# Patient Record
Sex: MALE | Race: WHITE | Employment: OTHER | URBAN - METROPOLITAN AREA
[De-identification: names, ages, dates, MRNs, and addresses within clinical notes are randomized per-mention and may not be internally consistent; named-entity substitution may affect disease eponyms.]

---

## 2018-03-11 PROBLEM — R07.9 CHEST PAIN: Status: ACTIVE | Noted: 2018-03-11

## 2020-05-19 ENCOUNTER — APPOINTMENT (OUTPATIENT)
Dept: GENERAL RADIOLOGY | Age: 60
End: 2020-05-19
Payer: MEDICARE

## 2020-05-19 ENCOUNTER — HOSPITAL ENCOUNTER (OUTPATIENT)
Age: 60
Setting detail: OBSERVATION
Discharge: HOME | End: 2020-05-20
Attending: EMERGENCY MEDICINE | Admitting: INTERNAL MEDICINE
Payer: MEDICARE

## 2020-05-19 LAB
A/G RATIO: 1.5 (ref 1.1–2.2)
ALBUMIN SERPL-MCNC: 4.3 G/DL (ref 3.4–5)
ALP BLD-CCNC: 115 U/L (ref 40–129)
ALT SERPL-CCNC: 76 U/L (ref 10–40)
ANION GAP SERPL CALCULATED.3IONS-SCNC: 11 MMOL/L (ref 3–16)
ANISOCYTOSIS: ABNORMAL
AST SERPL-CCNC: 181 U/L (ref 15–37)
BASOPHILS ABSOLUTE: 0 K/UL (ref 0–0.2)
BASOPHILS RELATIVE PERCENT: 1.3 %
BILIRUB SERPL-MCNC: 1.5 MG/DL (ref 0–1)
BUN BLDV-MCNC: 5 MG/DL (ref 7–20)
CALCIUM SERPL-MCNC: 9.4 MG/DL (ref 8.3–10.6)
CHLORIDE BLD-SCNC: 101 MMOL/L (ref 99–110)
CO2: 26 MMOL/L (ref 21–32)
CREAT SERPL-MCNC: <0.5 MG/DL (ref 0.8–1.3)
EOSINOPHILS ABSOLUTE: 0.1 K/UL (ref 0–0.6)
EOSINOPHILS RELATIVE PERCENT: 2.5 %
ETHANOL: NORMAL MG/DL (ref 0–0.08)
GFR AFRICAN AMERICAN: >60
GFR NON-AFRICAN AMERICAN: >60
GLOBULIN: 2.8 G/DL
GLUCOSE BLD-MCNC: 111 MG/DL (ref 70–99)
HCT VFR BLD CALC: 35.7 % (ref 40.5–52.5)
HEMATOLOGY PATH CONSULT: YES
HEMOGLOBIN: 12.5 G/DL (ref 13.5–17.5)
LYMPHOCYTES ABSOLUTE: 1.2 K/UL (ref 1–5.1)
LYMPHOCYTES RELATIVE PERCENT: 35.2 %
MAGNESIUM: 1 MG/DL (ref 1.8–2.4)
MCH RBC QN AUTO: 39.4 PG (ref 26–34)
MCHC RBC AUTO-ENTMCNC: 34.9 G/DL (ref 31–36)
MCV RBC AUTO: 113.1 FL (ref 80–100)
MONOCYTES ABSOLUTE: 0.3 K/UL (ref 0–1.3)
MONOCYTES RELATIVE PERCENT: 8.5 %
NEUTROPHILS ABSOLUTE: 1.8 K/UL (ref 1.7–7.7)
NEUTROPHILS RELATIVE PERCENT: 52.5 %
PDW BLD-RTO: 16.1 % (ref 12.4–15.4)
PLATELET # BLD: 118 K/UL (ref 135–450)
PLATELET SLIDE REVIEW: ABNORMAL
PMV BLD AUTO: 7.5 FL (ref 5–10.5)
POIKILOCYTES: ABNORMAL
POLYCHROMASIA: ABNORMAL
POTASSIUM REFLEX MAGNESIUM: 3.4 MMOL/L (ref 3.5–5.1)
RBC # BLD: 3.16 M/UL (ref 4.2–5.9)
SLIDE REVIEW: ABNORMAL
SODIUM BLD-SCNC: 138 MMOL/L (ref 136–145)
TOTAL PROTEIN: 7.1 G/DL (ref 6.4–8.2)
TROPONIN: <0.01 NG/ML
TROPONIN: <0.01 NG/ML
WBC # BLD: 3.4 K/UL (ref 4–11)

## 2020-05-19 PROCEDURE — 96366 THER/PROPH/DIAG IV INF ADDON: CPT

## 2020-05-19 PROCEDURE — G0480 DRUG TEST DEF 1-7 CLASSES: HCPCS

## 2020-05-19 PROCEDURE — 6360000002 HC RX W HCPCS: Performed by: EMERGENCY MEDICINE

## 2020-05-19 PROCEDURE — 83735 ASSAY OF MAGNESIUM: CPT

## 2020-05-19 PROCEDURE — G0378 HOSPITAL OBSERVATION PER HR: HCPCS

## 2020-05-19 PROCEDURE — 2580000003 HC RX 258: Performed by: NURSE PRACTITIONER

## 2020-05-19 PROCEDURE — 6370000000 HC RX 637 (ALT 250 FOR IP): Performed by: EMERGENCY MEDICINE

## 2020-05-19 PROCEDURE — 96365 THER/PROPH/DIAG IV INF INIT: CPT

## 2020-05-19 PROCEDURE — 36415 COLL VENOUS BLD VENIPUNCTURE: CPT

## 2020-05-19 PROCEDURE — 80053 COMPREHEN METABOLIC PANEL: CPT

## 2020-05-19 PROCEDURE — 2580000003 HC RX 258: Performed by: EMERGENCY MEDICINE

## 2020-05-19 PROCEDURE — 71045 X-RAY EXAM CHEST 1 VIEW: CPT

## 2020-05-19 PROCEDURE — 2500000003 HC RX 250 WO HCPCS: Performed by: EMERGENCY MEDICINE

## 2020-05-19 PROCEDURE — 84484 ASSAY OF TROPONIN QUANT: CPT

## 2020-05-19 PROCEDURE — 99291 CRITICAL CARE FIRST HOUR: CPT

## 2020-05-19 PROCEDURE — 6370000000 HC RX 637 (ALT 250 FOR IP): Performed by: NURSE PRACTITIONER

## 2020-05-19 PROCEDURE — 93005 ELECTROCARDIOGRAM TRACING: CPT | Performed by: EMERGENCY MEDICINE

## 2020-05-19 PROCEDURE — 85025 COMPLETE CBC W/AUTO DIFF WBC: CPT

## 2020-05-19 RX ORDER — POLYETHYLENE GLYCOL 3350 17 G/17G
17 POWDER, FOR SOLUTION ORAL DAILY PRN
Status: DISCONTINUED | OUTPATIENT
Start: 2020-05-19 | End: 2020-05-20 | Stop reason: HOSPADM

## 2020-05-19 RX ORDER — RANITIDINE 150 MG/1
150 TABLET ORAL 2 TIMES DAILY
Status: DISCONTINUED | OUTPATIENT
Start: 2020-05-19 | End: 2020-05-20

## 2020-05-19 RX ORDER — ATORVASTATIN CALCIUM 40 MG/1
40 TABLET, FILM COATED ORAL NIGHTLY
Status: DISCONTINUED | OUTPATIENT
Start: 2020-05-19 | End: 2020-05-20 | Stop reason: HOSPADM

## 2020-05-19 RX ORDER — MAGNESIUM SULFATE IN WATER 40 MG/ML
2 INJECTION, SOLUTION INTRAVENOUS ONCE
Status: COMPLETED | OUTPATIENT
Start: 2020-05-19 | End: 2020-05-19

## 2020-05-19 RX ORDER — PROMETHAZINE HYDROCHLORIDE 25 MG/1
12.5 TABLET ORAL EVERY 6 HOURS PRN
Status: DISCONTINUED | OUTPATIENT
Start: 2020-05-19 | End: 2020-05-20 | Stop reason: HOSPADM

## 2020-05-19 RX ORDER — VITAMIN B COMPLEX
2000 TABLET ORAL DAILY
Status: DISCONTINUED | OUTPATIENT
Start: 2020-05-20 | End: 2020-05-20 | Stop reason: HOSPADM

## 2020-05-19 RX ORDER — ALLOPURINOL 300 MG/1
300 TABLET ORAL DAILY
Status: DISCONTINUED | OUTPATIENT
Start: 2020-05-20 | End: 2020-05-20 | Stop reason: HOSPADM

## 2020-05-19 RX ORDER — NITROGLYCERIN 0.4 MG/1
0.4 TABLET SUBLINGUAL EVERY 5 MIN PRN
Status: DISCONTINUED | OUTPATIENT
Start: 2020-05-19 | End: 2020-05-20 | Stop reason: HOSPADM

## 2020-05-19 RX ORDER — OXYCODONE HYDROCHLORIDE AND ACETAMINOPHEN 5; 325 MG/1; MG/1
1 TABLET ORAL ONCE
Status: COMPLETED | OUTPATIENT
Start: 2020-05-19 | End: 2020-05-19

## 2020-05-19 RX ORDER — CHLORDIAZEPOXIDE HYDROCHLORIDE 25 MG/1
25 CAPSULE, GELATIN COATED ORAL 4 TIMES DAILY
Status: DISCONTINUED | OUTPATIENT
Start: 2020-05-19 | End: 2020-05-20 | Stop reason: HOSPADM

## 2020-05-19 RX ORDER — LISINOPRIL 20 MG/1
20 TABLET ORAL DAILY
Status: DISCONTINUED | OUTPATIENT
Start: 2020-05-20 | End: 2020-05-20 | Stop reason: HOSPADM

## 2020-05-19 RX ORDER — ACETAMINOPHEN 325 MG/1
650 TABLET ORAL EVERY 6 HOURS PRN
Status: DISCONTINUED | OUTPATIENT
Start: 2020-05-19 | End: 2020-05-20 | Stop reason: HOSPADM

## 2020-05-19 RX ORDER — SODIUM CHLORIDE 0.9 % (FLUSH) 0.9 %
10 SYRINGE (ML) INJECTION PRN
Status: DISCONTINUED | OUTPATIENT
Start: 2020-05-19 | End: 2020-05-20 | Stop reason: HOSPADM

## 2020-05-19 RX ORDER — OXYCODONE HYDROCHLORIDE AND ACETAMINOPHEN 5; 325 MG/1; MG/1
1 TABLET ORAL 3 TIMES DAILY PRN
Status: DISCONTINUED | OUTPATIENT
Start: 2020-05-19 | End: 2020-05-20 | Stop reason: HOSPADM

## 2020-05-19 RX ORDER — ONDANSETRON 2 MG/ML
4 INJECTION INTRAMUSCULAR; INTRAVENOUS EVERY 6 HOURS PRN
Status: DISCONTINUED | OUTPATIENT
Start: 2020-05-19 | End: 2020-05-20 | Stop reason: HOSPADM

## 2020-05-19 RX ORDER — ACETAMINOPHEN 650 MG/1
650 SUPPOSITORY RECTAL EVERY 6 HOURS PRN
Status: DISCONTINUED | OUTPATIENT
Start: 2020-05-19 | End: 2020-05-20 | Stop reason: HOSPADM

## 2020-05-19 RX ORDER — SODIUM CHLORIDE 0.9 % (FLUSH) 0.9 %
10 SYRINGE (ML) INJECTION EVERY 12 HOURS SCHEDULED
Status: DISCONTINUED | OUTPATIENT
Start: 2020-05-19 | End: 2020-05-20 | Stop reason: HOSPADM

## 2020-05-19 RX ADMIN — ATORVASTATIN CALCIUM 40 MG: 40 TABLET, FILM COATED ORAL at 23:40

## 2020-05-19 RX ADMIN — NITROGLYCERIN 1 INCH: 20 OINTMENT TOPICAL at 21:22

## 2020-05-19 RX ADMIN — OXYCODONE HYDROCHLORIDE AND ACETAMINOPHEN 1 TABLET: 5; 325 TABLET ORAL at 21:22

## 2020-05-19 RX ADMIN — CHLORDIAZEPOXIDE HYDROCHLORIDE 25 MG: 25 CAPSULE ORAL at 23:40

## 2020-05-19 RX ADMIN — MAGNESIUM SULFATE HEPTAHYDRATE 2 G: 40 INJECTION, SOLUTION INTRAVENOUS at 21:22

## 2020-05-19 RX ADMIN — Medication 10 ML: at 23:40

## 2020-05-19 RX ADMIN — THIAMINE HYDROCHLORIDE: 100 INJECTION, SOLUTION INTRAMUSCULAR; INTRAVENOUS at 23:40

## 2020-05-19 ASSESSMENT — PAIN DESCRIPTION - LOCATION: LOCATION: CHEST

## 2020-05-19 ASSESSMENT — PAIN SCALES - GENERAL
PAINLEVEL_OUTOF10: 3
PAINLEVEL_OUTOF10: 9

## 2020-05-19 ASSESSMENT — PAIN DESCRIPTION - ORIENTATION: ORIENTATION: MID

## 2020-05-19 ASSESSMENT — HEART SCORE: ECG: 1

## 2020-05-20 VITALS
SYSTOLIC BLOOD PRESSURE: 147 MMHG | HEART RATE: 70 BPM | WEIGHT: 181.9 LBS | OXYGEN SATURATION: 97 % | BODY MASS INDEX: 21.05 KG/M2 | HEIGHT: 78 IN | DIASTOLIC BLOOD PRESSURE: 82 MMHG | RESPIRATION RATE: 18 BRPM | TEMPERATURE: 98.2 F

## 2020-05-20 LAB
ALBUMIN SERPL-MCNC: 3.5 G/DL (ref 3.4–5)
ALP BLD-CCNC: 95 U/L (ref 40–129)
ALT SERPL-CCNC: 59 U/L (ref 10–40)
ANION GAP SERPL CALCULATED.3IONS-SCNC: 11 MMOL/L (ref 3–16)
AST SERPL-CCNC: 130 U/L (ref 15–37)
BILIRUB SERPL-MCNC: 1.9 MG/DL (ref 0–1)
BILIRUBIN DIRECT: 0.7 MG/DL (ref 0–0.3)
BILIRUBIN, INDIRECT: 1.2 MG/DL (ref 0–1)
BUN BLDV-MCNC: 6 MG/DL (ref 7–20)
CALCIUM SERPL-MCNC: 8.5 MG/DL (ref 8.3–10.6)
CHLORIDE BLD-SCNC: 103 MMOL/L (ref 99–110)
CHOLESTEROL, TOTAL: 112 MG/DL (ref 0–199)
CO2: 24 MMOL/L (ref 21–32)
CREAT SERPL-MCNC: <0.5 MG/DL (ref 0.8–1.3)
EKG ATRIAL RATE: 71 BPM
EKG ATRIAL RATE: 78 BPM
EKG DIAGNOSIS: NORMAL
EKG DIAGNOSIS: NORMAL
EKG P AXIS: 52 DEGREES
EKG P AXIS: 56 DEGREES
EKG P-R INTERVAL: 154 MS
EKG P-R INTERVAL: 164 MS
EKG Q-T INTERVAL: 374 MS
EKG Q-T INTERVAL: 422 MS
EKG QRS DURATION: 84 MS
EKG QRS DURATION: 88 MS
EKG QTC CALCULATION (BAZETT): 426 MS
EKG QTC CALCULATION (BAZETT): 458 MS
EKG R AXIS: -23 DEGREES
EKG R AXIS: -26 DEGREES
EKG T AXIS: 30 DEGREES
EKG T AXIS: 40 DEGREES
EKG VENTRICULAR RATE: 71 BPM
EKG VENTRICULAR RATE: 78 BPM
FOLATE: >20 NG/ML (ref 4.78–24.2)
GFR AFRICAN AMERICAN: >60
GFR NON-AFRICAN AMERICAN: >60
GLUCOSE BLD-MCNC: 93 MG/DL (ref 70–99)
HCT VFR BLD CALC: 31 % (ref 40.5–52.5)
HDLC SERPL-MCNC: 61 MG/DL (ref 40–60)
HEMATOLOGY PATH CONSULT: NO
HEMATOLOGY PATH CONSULT: NORMAL
HEMOGLOBIN: 10.8 G/DL (ref 13.5–17.5)
LDL CHOLESTEROL CALCULATED: 41 MG/DL
LV EF: 60 %
LVEF MODALITY: NORMAL
MAGNESIUM: 1.4 MG/DL (ref 1.8–2.4)
MCH RBC QN AUTO: 39.9 PG (ref 26–34)
MCHC RBC AUTO-ENTMCNC: 35 G/DL (ref 31–36)
MCV RBC AUTO: 114.2 FL (ref 80–100)
PDW BLD-RTO: 15.6 % (ref 12.4–15.4)
PLATELET # BLD: 99 K/UL (ref 135–450)
PLATELET SLIDE REVIEW: ABNORMAL
PMV BLD AUTO: 8 FL (ref 5–10.5)
POTASSIUM REFLEX MAGNESIUM: 3.2 MMOL/L (ref 3.5–5.1)
RBC # BLD: 2.71 M/UL (ref 4.2–5.9)
SLIDE REVIEW: ABNORMAL
SODIUM BLD-SCNC: 138 MMOL/L (ref 136–145)
TOTAL PROTEIN: 5.9 G/DL (ref 6.4–8.2)
TRIGL SERPL-MCNC: 51 MG/DL (ref 0–150)
TROPONIN: <0.01 NG/ML
VITAMIN B-12: 946 PG/ML (ref 211–911)
VLDLC SERPL CALC-MCNC: 10 MG/DL
WBC # BLD: 3 K/UL (ref 4–11)

## 2020-05-20 PROCEDURE — G0378 HOSPITAL OBSERVATION PER HR: HCPCS

## 2020-05-20 PROCEDURE — 2580000003 HC RX 258: Performed by: NURSE PRACTITIONER

## 2020-05-20 PROCEDURE — 6360000004 HC RX CONTRAST MEDICATION: Performed by: INTERNAL MEDICINE

## 2020-05-20 PROCEDURE — 6370000000 HC RX 637 (ALT 250 FOR IP): Performed by: NURSE PRACTITIONER

## 2020-05-20 PROCEDURE — 84484 ASSAY OF TROPONIN QUANT: CPT

## 2020-05-20 PROCEDURE — 83735 ASSAY OF MAGNESIUM: CPT

## 2020-05-20 PROCEDURE — 6360000002 HC RX W HCPCS: Performed by: INTERNAL MEDICINE

## 2020-05-20 PROCEDURE — 6370000000 HC RX 637 (ALT 250 FOR IP): Performed by: INTERNAL MEDICINE

## 2020-05-20 PROCEDURE — 80048 BASIC METABOLIC PNL TOTAL CA: CPT

## 2020-05-20 PROCEDURE — 93005 ELECTROCARDIOGRAM TRACING: CPT | Performed by: NURSE PRACTITIONER

## 2020-05-20 PROCEDURE — 93010 ELECTROCARDIOGRAM REPORT: CPT | Performed by: INTERNAL MEDICINE

## 2020-05-20 PROCEDURE — 80061 LIPID PANEL: CPT

## 2020-05-20 PROCEDURE — 85027 COMPLETE CBC AUTOMATED: CPT

## 2020-05-20 PROCEDURE — 96366 THER/PROPH/DIAG IV INF ADDON: CPT

## 2020-05-20 PROCEDURE — 82607 VITAMIN B-12: CPT

## 2020-05-20 PROCEDURE — 93351 STRESS TTE COMPLETE: CPT

## 2020-05-20 PROCEDURE — 99204 OFFICE O/P NEW MOD 45 MIN: CPT | Performed by: INTERNAL MEDICINE

## 2020-05-20 PROCEDURE — 80076 HEPATIC FUNCTION PANEL: CPT

## 2020-05-20 PROCEDURE — 82746 ASSAY OF FOLIC ACID SERUM: CPT

## 2020-05-20 RX ORDER — CARVEDILOL 6.25 MG/1
6.25 TABLET ORAL 2 TIMES DAILY WITH MEALS
Status: DISCONTINUED | OUTPATIENT
Start: 2020-05-20 | End: 2020-05-20 | Stop reason: HOSPADM

## 2020-05-20 RX ORDER — CARVEDILOL 6.25 MG/1
6.25 TABLET ORAL 2 TIMES DAILY WITH MEALS
Qty: 60 TABLET | Refills: 3 | Status: SHIPPED | OUTPATIENT
Start: 2020-05-20 | End: 2020-05-26

## 2020-05-20 RX ORDER — FAMOTIDINE 20 MG/1
20 TABLET, FILM COATED ORAL 2 TIMES DAILY
Status: DISCONTINUED | OUTPATIENT
Start: 2020-05-20 | End: 2020-05-20 | Stop reason: HOSPADM

## 2020-05-20 RX ORDER — HYDRALAZINE HYDROCHLORIDE 20 MG/ML
10 INJECTION INTRAMUSCULAR; INTRAVENOUS EVERY 4 HOURS PRN
Status: DISCONTINUED | OUTPATIENT
Start: 2020-05-20 | End: 2020-05-20 | Stop reason: HOSPADM

## 2020-05-20 RX ORDER — MAGNESIUM SULFATE IN WATER 40 MG/ML
2 INJECTION, SOLUTION INTRAVENOUS ONCE
Status: COMPLETED | OUTPATIENT
Start: 2020-05-20 | End: 2020-05-20

## 2020-05-20 RX ORDER — POTASSIUM CHLORIDE 20 MEQ/1
40 TABLET, EXTENDED RELEASE ORAL 3 TIMES DAILY
Status: COMPLETED | OUTPATIENT
Start: 2020-05-20 | End: 2020-05-20

## 2020-05-20 RX ADMIN — LISINOPRIL 20 MG: 20 TABLET ORAL at 09:45

## 2020-05-20 RX ADMIN — FAMOTIDINE 20 MG: 20 TABLET, FILM COATED ORAL at 09:45

## 2020-05-20 RX ADMIN — MAGNESIUM SULFATE HEPTAHYDRATE 2 G: 40 INJECTION, SOLUTION INTRAVENOUS at 11:48

## 2020-05-20 RX ADMIN — POTASSIUM CHLORIDE 40 MEQ: 20 TABLET, EXTENDED RELEASE ORAL at 15:32

## 2020-05-20 RX ADMIN — PERFLUTREN 2.2 MG: 6.52 INJECTION, SUSPENSION INTRAVENOUS at 09:02

## 2020-05-20 RX ADMIN — OXYCODONE HYDROCHLORIDE AND ACETAMINOPHEN 1 TABLET: 5; 325 TABLET ORAL at 02:27

## 2020-05-20 RX ADMIN — CARVEDILOL 6.25 MG: 6.25 TABLET, FILM COATED ORAL at 17:24

## 2020-05-20 RX ADMIN — VITAMIN D, TAB 1000IU (100/BT) 2000 UNITS: 25 TAB at 09:45

## 2020-05-20 RX ADMIN — NITROGLYCERIN 0.4 MG: 0.4 TABLET, ORALLY DISINTEGRATING SUBLINGUAL at 08:31

## 2020-05-20 RX ADMIN — Medication 10 ML: at 09:46

## 2020-05-20 RX ADMIN — CHLORDIAZEPOXIDE HYDROCHLORIDE 25 MG: 25 CAPSULE ORAL at 15:32

## 2020-05-20 RX ADMIN — ALLOPURINOL 300 MG: 300 TABLET ORAL at 09:45

## 2020-05-20 RX ADMIN — POTASSIUM CHLORIDE 40 MEQ: 20 TABLET, EXTENDED RELEASE ORAL at 11:44

## 2020-05-20 RX ADMIN — CHLORDIAZEPOXIDE HYDROCHLORIDE 25 MG: 25 CAPSULE ORAL at 09:45

## 2020-05-20 RX ADMIN — OXYCODONE HYDROCHLORIDE AND ACETAMINOPHEN 1 TABLET: 5; 325 TABLET ORAL at 11:48

## 2020-05-20 ASSESSMENT — ENCOUNTER SYMPTOMS
STRIDOR: 0
ABDOMINAL PAIN: 0
VOMITING: 0
WHEEZING: 0
NAUSEA: 0
COUGH: 0
BACK PAIN: 0
CHEST TIGHTNESS: 1
COLOR CHANGE: 0
SHORTNESS OF BREATH: 1

## 2020-05-20 ASSESSMENT — PAIN DESCRIPTION - PAIN TYPE: TYPE: CHRONIC PAIN

## 2020-05-20 ASSESSMENT — PAIN SCALES - GENERAL
PAINLEVEL_OUTOF10: 5
PAINLEVEL_OUTOF10: 7
PAINLEVEL_OUTOF10: 0

## 2020-05-20 ASSESSMENT — PAIN DESCRIPTION - LOCATION: LOCATION: GENERALIZED;BACK;LEG

## 2020-05-20 ASSESSMENT — PAIN DESCRIPTION - ORIENTATION: ORIENTATION: RIGHT;LEFT

## 2020-05-20 NOTE — PROGRESS NOTES
insight  Capillary Refill: Brisk,< 3 seconds   Peripheral Pulses: +2 palpable, equal bilaterally       Labs:   Recent Labs     05/19/20 1953 05/20/20 0317   WBC 3.4* 3.0*   HGB 12.5* 10.8*   HCT 35.7* 31.0*   * 99*     Recent Labs     05/19/20 1953 05/20/20 0317    138   K 3.4* 3.2*    103   CO2 26 24   BUN 5* 6*   CREATININE <0.5* <0.5*   CALCIUM 9.4 8.5     Recent Labs     05/19/20 1953 05/20/20 0317   * 130*   ALT 76* 59*   BILIDIR  --  0.7*   BILITOT 1.5* 1.9*   ALKPHOS 115 95     No results for input(s): INR in the last 72 hours. Recent Labs     05/19/20 1953 05/19/20 2306 05/20/20 0317   TROPONINI <0.01 <0.01 <0.01       Urinalysis:      Lab Results   Component Value Date    NITRU Negative 03/11/2018    BLOODU Negative 03/11/2018    SPECGRAV 1.020 03/11/2018    GLUCOSEU Negative 03/11/2018       Consults:    IP CONSULT TO HOSPITALIST      Assessment/Plan:    Active Hospital Problems    Diagnosis    Chest pain [R07.9]    Essential hypertension [I10]     Chest pain - Concerning to ED for ACS, etiology clinically unable to determine. Initial enzymes/EKG negative. Followed serial enzymes, reviewed and documented as above and monitor on tele, w/out evidence of ischemia/arrythmia. Stress test ordered and pending but patient requested to see Cardiology nonetheless.      HTN - w/out known CAD and no evidence of active signs/sxs of ischemia/failure. Currently controlled on home meds w/ vitals reviewed and documented as above.     Acute transaminitis in patient with known alcohol use  - Pt with elevated AST 76 / on admission  - will trend  - counseled on importance of quitting drinking  - CIWA protocol if needed  - librium 4x daily  - ethanol level negative on admission  - banana bag given in ED     Anemia - etiology clinically unable to determine, w/out evidence of active bleeding/hemolysis. Asymptomatic w/out indication for transfusion. Follow serial labs.   Reviewed and documented as above. HypoKalemia - etiology clinically unable to determine. Follow serial labs and replace PRN - ordered PO 20 May. Reviewed and documented as above. HypoMagnesemia - etiology clinically unable to determine. Follow serial labs and replace PRN - ordered IV 20 May. Reviewed and documented as above. DVT Prophylaxis: LMWH  Diet: Diet NPO, After Midnight  Code Status: Full Code      PT/OT Eval Status: not yet ordered. Dispo - placed in OBServation. Possibly to home Wed/Thurs 20/21 May pending stress results.      Jaclyn Kohler MD

## 2020-05-20 NOTE — PROGRESS NOTES
A stress echo was completed on this patient as ordered. The patient tolerated the procedure well. Patient denied any chest pain with exercise.

## 2020-05-20 NOTE — PROGRESS NOTES
4 Eyes Skin Assessment     The patient is being assess for  Admission    I agree that 2 RN's have performed a thorough Head to Toe Skin Assessment on the patient. ALL assessment sites listed below have been assessed. Areas assessed by both nurses:   [x]   Head, Face, and Ears   [x]   Shoulders, Back, and Chest  [x]   Arms, Elbows, and Hands   [x]   Coccyx, Sacrum, and IschIum  [x]   Legs, Feet, and Heels        Does the Patient have Skin Breakdown?   No         Dakota Prevention initiated:  NA   Wound Care Orders initiated:  NA      [facility] nurse consulted for Pressure Injury (Stage 3,4, Unstageable, DTI, NWPT, and Complex wounds), New and Established Ostomies:  NA      Nurse 1 eSignature: Electronically signed by Roberto Carlos Torres RN on 5/20/20 at 12:59 AM EDT    **SHARE this note so that the co-signing nurse is able to place an eSignature**    Nurse 2 eSignature: Electronically signed by Kerrie Jara RN on 5/20/20 at 5:09 AM EDT

## 2020-05-20 NOTE — PROGRESS NOTES
Pt d/c'd. Removed IV and stopped bleeding. Catheter intact. Pt tolerated well. No redness noted at site. Notified CMU and removed tele box. Reviewed d/c instructions, home meds, and  f/u information utilizing teach-back method. Information on patient's new medication Coreg and where to  medication given to patient. Patient verbalized understanding. Pt assisted off the unit via a wheelchair with all belongings and assisted into a private car.

## 2020-05-20 NOTE — PLAN OF CARE
Problem: Falls - Risk of:  Goal: Will remain free from falls  Description: Will remain free from falls  Outcome: Ongoing  Goal: Absence of physical injury  Description: Absence of physical injury  Outcome: Ongoing     Problem: Pain:  Goal: Pain level will decrease  Description: Pain level will decrease  Outcome: Ongoing

## 2020-05-20 NOTE — ED PROVIDER NOTES
Social Needs    Financial resource strain: None    Food insecurity     Worry: None     Inability: None    Transportation needs     Medical: None     Non-medical: None   Tobacco Use    Smoking status: Former Smoker     Packs/day: 1.00     Years: 40.00     Pack years: 40.00     Last attempt to quit: 2015     Years since quittin.1    Smokeless tobacco: Never Used   Substance and Sexual Activity    Alcohol use: Yes     Alcohol/week: 15.0 standard drinks     Types: 5 Cans of beer, 10 Shots of liquor per week    Drug use: No    Sexual activity: Not Currently   Lifestyle    Physical activity     Days per week: None     Minutes per session: None    Stress: None   Relationships    Social connections     Talks on phone: None     Gets together: None     Attends Restoration service: None     Active member of club or organization: None     Attends meetings of clubs or organizations: None     Relationship status: None    Intimate partner violence     Fear of current or ex partner: None     Emotionally abused: None     Physically abused: None     Forced sexual activity: None   Other Topics Concern    None   Social History Narrative    None       SCREENINGS    [place] Coma Scale  Eye Opening: Spontaneous  Best Verbal Response: Oriented  Best Motor Response: Obeys commands  Holli Coma Scale Score: 15 Heart Score for chest pain patients  History:  Moderately Suspicious  ECG: Non-Specifc repolarization disturbance/LBTB/PM  Patient Age: > 39 and < 65 years  *Risk factors for Atherosclerotic disease: Hypertension, Hypercholesterolemia  Risk Factors: > 3 Risk factors or history of atherosclerotic disease*  Troponin: < 1X normal limit  Heart Score Total: 5         PHYSICAL EXAM    (up to 7 for level 4, 8 or more for level 5)     ED Triage Vitals [20 1950]   BP Temp Temp Source Pulse Resp SpO2 Height Weight   (!) 145/102 98.9 °F (37.2 °C) Oral 88 20 99 % 6' 6\" (1.981 m) 187 lb (84.8 kg)       [facility] [place],  [place], [place] [employer]   Phone [phone]   ETHANOL    Narrative:     Performed at:  [facility]  [address],  [place], [place] [employer] [employer]   Phone [phone]   TROPONIN    Narrative:     Performed at:  [facility]  [address],  [place], [place] [employer]   Phone [phone]   TROPONIN   CBC   HEPATIC FUNCTION PANEL   BASIC METABOLIC PANEL W/ REFLEX TO MG FOR LOW K   LIPID PANEL       All other labs were within normal range or not returned asof this dictation. EKG: All EKG's are interpreted by the Emergency Department Physician who either signs or Co-signs this chart in the absence of a cardiologist.    The Ekg interpreted by me shows  normal sinus rhythm with a rate of 78  Axis is   Normal  QTc is  normal  Intervals and Durations are unremarkable. ST Segments: no acute change and nonspecific changes  No significant change from prior EKG dated - 3/11/18  No STEMI           RADIOLOGY:   Non-plain film images such as CT, Ultrasound and MRI are read by the radiologist. Eric jamal images are visualized and preliminarily interpreted by the  ED Provider with the belowfindings:        Interpretation per the Radiologist below, if available at the time of this note:    XR CHEST PORTABLE   Final Result   Clear lungs. No acute abnormality. Moderate emphysematous changes. PROCEDURES   Unless otherwise noted below, none     Procedures    CRITICAL CARE TIME   Time: at least 30 minutes   Includes repeat examinations, speaking with consultants, lab interpretation, charting, replacing magnesium due to critically low level along with treating with Nitro Paste due to concern for acute coronary syndrome   Excludes separate billable procedures. Patient at risk for serious decompensation if not treated for this life-threatening condition. CONSULTS: Spoke with NP Ruddy for admission.     IP CONSULT TO

## 2020-05-20 NOTE — CONSULTS
[address] [facility]  [phone]      Attending Physician: Saeed Roberts MD  Reason for Consultation/Chief Complaint: Chest pain    Subjective   History of Present Illness:  Rene Nelson is a 62 y.o. patient who presented to the hospital with complaints of chest pain over the last 1 to 2 days. Describes a substernal heaviness like somebody is kneeling into his chest and he did have some radiation to his arm. He says the radiation to the arm has subsided but he continues to have some chest pressure. No shortness of breath or nausea vomiting diaphoresis. He presented to the emergency room yesterday and was given both nitroglycerin as well as pain medication he says that did help alleviate the symptoms somewhat. He was admitted to the hospital and troponin levels have been negative. Stress test was done and this was found to be normal.    Chronically, he does have a history of hypertension, alcohol use and back pain. He denies prior history of cardiac disease. He says he has had a history of hemochromatosis and thrombocytopenia and has seen a hematologist in the past and his ferritin levels have been as high as 5000 in the past but he says this is now \"under control\". He does say he has been noted to also have elevated liver function tests and during the course of the work-up here in the hospital his ALT and AST were found to be mild to moderately elevated. Past Medical History:   has a past medical history of Hypertension, Hyperuricemia, Lumbar disc disease, and Prostate cancer ([address] Surgical History:   has a past surgical history that includes knee surgery (Right, 2009); Elbow surgery (Right, 2004); Prostate surgery (2006); Spine surgery; and Colonoscopy (2009). Social History:   reports that he quit smoking about 5 years ago. He has a 40.00 pack-year smoking history.  He has never used smokeless tobacco. He reports current alcohol use of about 15.0 standard drinks of alcohol per week. He reports that he does not use drugs. Family History:  family history includes Cancer in his brother, mother, and sister; Parkinsonism in his father. Home Medications:  Were reviewed and are listed in nursing record and/or below  Prior to Admission medications    Medication Sig Start Date End Date Taking? Authorizing Provider   methocarbamol (ROBAXIN) 750 MG tablet TAKE ONE TABLET BY MOUTH THREE TIMES A DAY 7/28/16   Maurizio Mart, DO   oxyCODONE-acetaminophen (PERCOCET) 5-325 MG per tablet Take 1 tablet by mouth 3 times daily as needed for Pain    Historical Provider, MD   allopurinol (ZYLOPRIM) 300 MG tablet TAKE ONE TABLET BY MOUTH DAILY 5/22/16   Maurizio Mart, DO   lisinopril (PRINIVIL;ZESTRIL) 20 MG tablet TAKE ONE TABLET BY MOUTH DAILY 3/8/16   Kentrell Diop, DO   Cholecalciferol (VITAMIN D3) 2000 UNITS CAPS Take 2,000 Units by mouth daily. Historical Provider, MD   ranitidine (ZANTAC) 150 MG tablet Take 150 mg by mouth 2 times daily.     Historical Provider, MD        CURRENT Medications:  famotidine (PEPCID) tablet 20 mg, BID  hydrALAZINE (APRESOLINE) injection 10 mg, Q4H PRN  carvedilol (COREG) tablet 6.25 mg, BID WC  allopurinol (ZYLOPRIM) tablet 300 mg, Daily  lisinopril (PRINIVIL;ZESTRIL) tablet 20 mg, Daily  Vitamin D (CHOLECALCIFEROL) tablet 2,000 Units, Daily  oxyCODONE-acetaminophen (PERCOCET) 5-325 MG per tablet 1 tablet, TID PRN  sodium chloride flush 0.9 % injection 10 mL, 2 times per day  sodium chloride flush 0.9 % injection 10 mL, PRN  acetaminophen (TYLENOL) tablet 650 mg, Q6H PRN    Or  acetaminophen (TYLENOL) suppository 650 mg, Q6H PRN  polyethylene glycol (GLYCOLAX) packet 17 g, Daily PRN  promethazine (PHENERGAN) tablet 12.5 mg, Q6H PRN    Or  ondansetron (ZOFRAN) injection 4 mg, Q6H PRN  atorvastatin (LIPITOR) tablet 40 mg, Nightly  chlordiazePOXIDE (LIBRIUM) capsule 25 mg, 4x Daily  enoxaparin (LOVENOX) injection 40 mg, Daily  nitroGLYCERIN (NITROSTAT) SL PROT 5.9 05/20/2020    CALCIUM 8.5 05/20/2020    BILITOT 1.9 05/20/2020    ALKPHOS 95 05/20/2020     05/20/2020    ALT 59 05/20/2020     PT/INR:  No results found for: PTINR  HgBA1c:No results found for: LABA1C  Lab Results   Component Value Date    TROPONINI <0.01 05/20/2020         Cardiac Data     Last EKG: Normal sinus rhythm, possible inferior infarct, similar to EKG from 2018    Echo:    Stress Test:    Today:    Stress echo:    Conclusions      Summary   Normal stress ECHO. March 2018:    Conclusions        Summary    Normal myocardial perfusion study with normal left ventricular function,    size, and wall motion.    The estimated left ventricular function is 64%. Cath:    Studies:     Chest x-ray:       Impression   Clear lungs.  No acute abnormality.  Moderate emphysematous changes.             I have reviewed labs and imaging/xray/diagnostic testing in this note. Assessment and Plan          Patient Active Problem List   Diagnosis    Prostate cancer ([facility] [address]    Hyperuricemia    Lumbar disc disease    Essential hypertension    Chest pain       Chest pain, atypical, negative stress test done today, no further inpatient cardiac work-up is needed, will plan on outpatient follow-up and consider a cardiac MRI due to history of hemochromatosis. History of hemochromatosis with elevated LFTs, patient plans on following up with his primary care physician as well as hematology for this    Hypertension, continue lisinopril, add Coreg 6.25 mg p.o. twice daily    Thank you for allowing us to participate in the care of Erlin Francisco. Please call me with any questions [phone].     Kristina Boas, MD, [facility]   Interventional Cardiologist  [facility]  [phone] [facility]  [phone] [address] [place] [address]  5/20/2020 3:51 PM

## 2020-05-21 ENCOUNTER — TELEPHONE (OUTPATIENT)
Dept: CARDIOLOGY | Age: 60
End: 2020-05-21

## 2020-05-21 NOTE — DISCHARGE SUMMARY
Hospital Medicine Discharge Summary    Patient ID: Rene Nelson      Patient's PCP: Cierra Thurman DO    Admit Date: 5/19/2020     Discharge Date: 5/20/2020      Admitting Physician: Beulah Davis MD     Discharge Physician: Saeed Roberts MD     Discharge Diagnoses: Active Hospital Problems    Diagnosis    Chest pain [R07.9]    Essential hypertension [I10]       The patient was seen and examined on day of discharge and this discharge summary is in conjunction with any daily progress note from day of discharge. Hospital Course:         Chest pain - Concerning to ED for ACS, etiology clinically unable to determine. Initial enzymes/EKG negative. Followed serial enzymes and monitored on tele, w/out evidence of ischemia/arrythmia. Stress test negative for reversible ischemia. Patient requested to see Cardiology nonetheless - consulted and appreciated w/ recs to f/u outpt.      HTN - w/out known CAD and no evidence of active signs/sxs of ischemia/failure. Currently controlled on home meds w/ vitals reviewed and documented as above.     Acute transaminitis in patient with known alcohol use  - Pt with elevated AST 76 /ALT 181 on admission  - will trend  - counseled on importance of quitting drinking  - CIWA protocol if needed  - Librium  - ethanol level negative on admission  - banana bag given in ED     Anemia - etiology clinically unable to determine, w/out evidence of active bleeding/hemolysis. Asymptomatic w/out indication for transfusion. Followed serial labs - stable.       HypoKalemia - etiology clinically unable to determine. Followed serial labs and replaced PRN - ordered PO 20 May.       HypoMagnesemia - etiology clinically unable to determine. Followed serial labs and replaced PRN - ordered IV 20 May.           Labs:  For convenience and continuity at follow-up the following most recent labs are provided:      CBC:    Lab Results   Component Value Date    WBC 3.0 05/20/2020 HGB 10.8 05/20/2020    HCT 31.0 05/20/2020    PLT 99 05/20/2020       Renal:    Lab Results   Component Value Date     05/20/2020    K 3.2 05/20/2020     05/20/2020    CO2 24 05/20/2020    BUN 6 05/20/2020    CREATININE <0.5 05/20/2020    CALCIUM 8.5 05/20/2020         Significant Diagnostic Studies    Radiology:   XR CHEST PORTABLE   Final Result   Clear lungs. No acute abnormality. Moderate emphysematous changes. Consults:     IP CONSULT TO HOSPITALIST  IP CONSULT TO CARDIOLOGY    Disposition: home     Condition at Discharge: Stable    Discharge Instructions/Follow-up:  w/ PCP 1-2 weeks and subspecialists as arranged. Code Status:  Full Code    Activity: activity as tolerated    Diet: regular diet      Discharge Medications:     Discharge Medication List as of 5/20/2020  5:18 PM           Details   carvedilol (COREG) 6.25 MG tablet Take 1 tablet by mouth 2 times daily (with meals), Disp-60 tablet, R-3Normal              Details   methocarbamol (ROBAXIN) 750 MG tablet TAKE ONE TABLET BY MOUTH THREE TIMES A DAY, Disp-90 tablet, R-2Normal      oxyCODONE-acetaminophen (PERCOCET) 5-325 MG per tablet Take 1 tablet by mouth 3 times daily as needed for Pain      allopurinol (ZYLOPRIM) 300 MG tablet TAKE ONE TABLET BY MOUTH DAILY, Disp-90 tablet, R-2      lisinopril (PRINIVIL;ZESTRIL) 20 MG tablet TAKE ONE TABLET BY MOUTH DAILY, Disp-90 tablet, R-3      Cholecalciferol (VITAMIN D3) 2000 UNITS CAPS Take 2,000 Units by mouth daily. ranitidine (ZANTAC) 150 MG tablet Take 150 mg by mouth 2 times daily. Time Spent on discharge is more than 30 minutes in the examination, evaluation, counseling and review of medications and discharge plan. Signed:    Steve Obrien MD   5/21/2020      Thank you Fan Salmeron DO for the opportunity to be involved in this patient's care. If you have any questions or concerns please feel free to contact me at [phone].

## 2020-05-26 ENCOUNTER — TELEPHONE (OUTPATIENT)
Dept: CARDIOLOGY CLINIC | Age: 60
End: 2020-05-26

## 2020-05-26 RX ORDER — CARVEDILOL 3.12 MG/1
3.12 TABLET ORAL 2 TIMES DAILY WITH MEALS
COMMUNITY
End: 2020-06-29 | Stop reason: SDUPTHER

## 2020-05-26 NOTE — TELEPHONE ENCOUNTER
Lets have him get 2 week event monitor and will need cardiac MRI at [facility] w/ dr Brynn Nelson to read and would reduced coreg to 3.125mg bid. If he has any more sx, needs to go to ER, Thank you.

## 2020-05-26 NOTE — TELEPHONE ENCOUNTER
Per wife since starting carvedilol (COREG) 6.25 MG tablet pt has been passing out. Pt is dizzy and really out of it. Started medication in hospital last week. B/P is low, 104/79 last night. Please advise.

## 2020-05-27 ENCOUNTER — NURSE ONLY (OUTPATIENT)
Dept: CARDIOLOGY CLINIC | Age: 60
End: 2020-05-27

## 2020-06-22 PROCEDURE — 93228 REMOTE 30 DAY ECG REV/REPORT: CPT | Performed by: INTERNAL MEDICINE

## 2020-06-25 ENCOUNTER — TELEPHONE (OUTPATIENT)
Dept: CARDIOLOGY CLINIC | Age: 60
End: 2020-06-25

## 2020-06-25 NOTE — TELEPHONE ENCOUNTER
Spoke to pt. Per DR Alec Nelson, cardiac monitor showed occasional heart beats and a faster heart rate. Recommend follow up to discuss.  He is scheduled with Chidi Koch on 06/29/20

## 2020-06-29 ENCOUNTER — OFFICE VISIT (OUTPATIENT)
Dept: CARDIOLOGY CLINIC | Age: 60
End: 2020-06-29
Payer: MEDICARE

## 2020-06-29 VITALS
OXYGEN SATURATION: 96 % | DIASTOLIC BLOOD PRESSURE: 74 MMHG | BODY MASS INDEX: 21.54 KG/M2 | WEIGHT: 186.2 LBS | SYSTOLIC BLOOD PRESSURE: 116 MMHG | HEIGHT: 78 IN | HEART RATE: 90 BPM

## 2020-06-29 PROBLEM — E83.118 OTHER HEMOCHROMATOSIS: Status: ACTIVE | Noted: 2020-06-29

## 2020-06-29 PROCEDURE — 99214 OFFICE O/P EST MOD 30 MIN: CPT | Performed by: NURSE PRACTITIONER

## 2020-06-29 RX ORDER — PANTOPRAZOLE SODIUM 40 MG/1
TABLET, DELAYED RELEASE ORAL
COMMUNITY
Start: 2020-04-29

## 2020-06-29 RX ORDER — CARVEDILOL 3.12 MG/1
3.12 TABLET ORAL 2 TIMES DAILY WITH MEALS
Qty: 60 TABLET | Refills: 3 | Status: SHIPPED | OUTPATIENT
Start: 2020-06-29 | End: 2020-10-22 | Stop reason: SDUPTHER

## 2020-06-29 NOTE — PROGRESS NOTES
05/19/2020    CREATININE 0.6 03/11/2018     BNP: No results found for: PROBNP  Troponin: No components found for: TROPONIN  Lipids:   Lab Results   Component Value Date    TRIG 51 05/20/2020    TRIG 76 03/12/2018    HDL 61 05/20/2020    HDL 44 03/12/2018    LDLCALC 41 05/20/2020    LDLCALC 57 03/12/2018     Cardiac Imaging:  CARDIAC MRI 6/10/20: IMPRESSION:  There is no evidence of myocardial iron overload. The left ventricle is normal in size with preserved systolic function.  The cardiac index is elevated suggestive of a hypermetabolic state. The right ventricle is normal in size with preserved systolic function. The aortic root is dilated.  The descending aorta is dilated.    There is partial fusion of the right and non coronary cusps with restricted motion.  The aortic valve area is 2.1 cm2 by planimetry. Given multiple pulmonary abnormalities, consider contrasted chest CT for further evaluation. FINDINGS:   The left ventricle is normal in size with preserved systolic function.  There is hypertrabeculation of the apex.  There is no evidence of myocardial edema or iron overload. Global, precontrast T1 mapping is within normal limits.  However there is regional abnormality involving the basal inferolateral and inferior segments.  There is a resting perfusion defect involving the mid inferoseptal segment without associated late gadolinium enhancement suggestive of dark ring artifact.  Late gadolinium enhancement is of poor technical quality.  There is no obvious area of late gadolinium enhancement. The left atrium is at the upper limit of normal in size.  There is moderate mitral regurgitation.  The right ventricle is normal in size with preserved systolic function. The right atrium is mildly dilated.  There is mild to moderate tricuspid regurgitation.  The Eustachian valve is visualized.  A patent foramen ovale is present. Suzanne Nam is lipomatous hypertrophy of the interatrial septum.      The aortic root

## 2020-06-29 NOTE — LETTER
The aortic root is dilated.  The descending aorta is dilated. Arthurine Marquette is partial fusion of the right and non coronary cusps with restricted motion.  The aortic valve area is 2.1 cm2.  The pulmonary artery is normal in size.       The pericardium appears normal.    Mediastinal adenopathy (largest lymph node diameter 12 mm) is present.    There is a small, focal area of infiltrate involving the anterior portion of the right lung base.  There is scarring versus infiltrate in the right posterior lung base adjacent to the vertebral column.  There is degenerative joint disease of the thoracic spine.  A cystic mass is visualized at the lateral aspect of the left kidney (visualized on prior CT). EVENT MONITOR MAY 2020           STRESS ECHO 5/19/20:     Echo   Images obtained in 41 seconds. Baseline resting echocardiogram shows normal global LV systolic function   with an ejection fraction of 60% and uniform myocardial segmental wall   motion. Following stress there was uniform augmentation of all myocardial   segments with appropriate hyperdynamic LV systolic response to stress. ECG   Artifact seen present in EKG during stress limits interpretation. The stress ECG showed no ischemia at target heart rate. Turner Score of 6 (   low Risk ). Arrhythmias   No significant events. Symptoms   There was stress induced shortness of breath. Symptoms resolved with rest.     March 2018: STRESS MPI     Conclusions        Summary    Normal myocardial perfusion study with normal left ventricular function,    size, and wall motion. The estimated left ventricular function is 64%. Assessment:    1. Chest pain, unspecified type    2. Essential hypertension    3. Other hemochromatosis    4. Abnormal chest MRI          Plan:   1. Decrease the carvedilol (Coreg) from 6.25 mg to 3.125 mg twice daily  2. No change in other medications  3.  CT chest and abdomen with and without contrast

## 2020-07-06 ENCOUNTER — TELEPHONE (OUTPATIENT)
Dept: CARDIOLOGY CLINIC | Age: 60
End: 2020-07-06

## 2020-07-06 NOTE — TELEPHONE ENCOUNTER
Pt is scheduled for CT. Pt needs lab order. [facility] CT can do Creatinine only at their location. If [facility] wants more than Creatinine pt has to go lab site would need order placed in Epic. Order currently on chart is for CT Chest ABD pelvis w/wo contrast. CT dept states this is double the amount to radiation for pt. Please call dept for ordering instructions to limit the amount of radiation and to get needed images.

## 2020-07-07 NOTE — TELEPHONE ENCOUNTER
Okay to do just creatinine for CT scan. My intention was to order CT chest and abdomen with and without contrast.  Do not need CT pelvis. Can we change that order?      Thanks, [employer]

## 2020-07-09 ENCOUNTER — HOSPITAL ENCOUNTER (OUTPATIENT)
Dept: CT IMAGING | Age: 60
Discharge: HOME | End: 2020-07-09
Payer: MEDICARE

## 2020-07-09 LAB
GFR AFRICAN AMERICAN: >60
GFR NON-AFRICAN AMERICAN: >60
PERFORMED ON: NORMAL
POC CREATININE: 0.8 MG/DL (ref 0.8–1.3)
POC SAMPLE TYPE: NORMAL

## 2020-07-09 PROCEDURE — 82565 ASSAY OF CREATININE: CPT

## 2020-07-09 PROCEDURE — 74170 CT ABD WO CNTRST FLWD CNTRST: CPT

## 2020-07-09 PROCEDURE — 6360000004 HC RX CONTRAST MEDICATION: Performed by: NURSE PRACTITIONER

## 2020-07-09 PROCEDURE — 71270 CT THORAX DX C-/C+: CPT

## 2020-07-09 RX ADMIN — IOPAMIDOL 75 ML: 755 INJECTION, SOLUTION INTRAVENOUS at 08:15

## 2020-07-14 ENCOUNTER — OFFICE VISIT (OUTPATIENT)
Dept: PULMONOLOGY | Age: 60
End: 2020-07-14
Payer: MEDICARE

## 2020-07-14 ENCOUNTER — TELEPHONE (OUTPATIENT)
Dept: CARDIOLOGY CLINIC | Age: 60
End: 2020-07-14

## 2020-07-14 VITALS
HEART RATE: 91 BPM | RESPIRATION RATE: 16 BRPM | SYSTOLIC BLOOD PRESSURE: 118 MMHG | HEIGHT: 78 IN | BODY MASS INDEX: 20.94 KG/M2 | WEIGHT: 181 LBS | DIASTOLIC BLOOD PRESSURE: 82 MMHG | TEMPERATURE: 97.6 F | OXYGEN SATURATION: 98 %

## 2020-07-14 PROBLEM — R63.4 WEIGHT LOSS: Status: ACTIVE | Noted: 2020-07-14

## 2020-07-14 PROBLEM — R91.1 LUNG NODULE: Status: ACTIVE | Noted: 2020-07-14

## 2020-07-14 PROBLEM — J43.1 PANLOBULAR EMPHYSEMA (HCC): Status: ACTIVE | Noted: 2020-07-14

## 2020-07-14 PROBLEM — K76.0 HEPATIC STEATOSIS: Status: ACTIVE | Noted: 2020-07-14

## 2020-07-14 PROBLEM — Z87.891 FORMER SMOKER: Status: ACTIVE | Noted: 2020-07-14

## 2020-07-14 PROBLEM — Z78.9 ALCOHOL USE: Status: ACTIVE | Noted: 2020-07-14

## 2020-07-14 PROBLEM — I25.10 CORONARY ARTERY CALCIFICATION SEEN ON CT SCAN: Status: ACTIVE | Noted: 2020-07-14

## 2020-07-14 PROCEDURE — 99204 OFFICE O/P NEW MOD 45 MIN: CPT | Performed by: INTERNAL MEDICINE

## 2020-07-14 NOTE — TELEPHONE ENCOUNTER
----- Message from BEAR Bojorquez CNP sent at 7/13/2020  3:39 PM EDT -----  Let's refer him to GI (Schussler et al) for fluid in chest near distal esophagus and to pulmonary for nodules and mild to moderate emphysema. Reviewed with Dr. Balta Huerta.    Thank you,   BEAR Bojorquez CNP

## 2020-07-14 NOTE — TELEPHONE ENCOUNTER
Created telephone encounter. Spoke with Jeremy Villanueva relayed message per [employer] regarding chest CT results. Pt verbalized understanding.

## 2020-07-14 NOTE — PROGRESS NOTES
MA Communication:   The following orders are received by verbal communication from Sixto Fernandez MD    Orders include:  CT and fu in 1 year scheduled 7/13/21 and 7/14/21

## 2020-07-14 NOTE — PROGRESS NOTES
change in the admitting were made of any sick contacts, patient states that Coreg was started recently which is the only medication which is new, patient says that he does have albuterol inhaler which he uses once in a while but not on a regular basis and patient states that it was prescribed long time back and he still has 150 doses left in the inhaler, patient does not give history of any sleep fragmentation, patient does have history of alcohol use, patient does not have any confusion lethargy, no other pertinent review of system of concern    Past Medical History:   Diagnosis Date    Hypertension     Hyperuricemia     Lumbar disc disease     Prostate cancer ([facility] [address] 2006    removal       Past Surgical History:   Procedure Laterality Date    COLONOSCOPY  2009    2019    CYST REMOVAL      scrotum    ELBOW SURGERY Right 2004    Ulnar nerve transposition    KNEE SURGERY Right 2009    Arthroscopy    PROSTATE SURGERY  2006    Dr. Janel Anton      x 3  in          Allergies   Allergen Reactions    Asa [Aspirin]      Pt throws up       Medication list was reviewed and updated as needed in Epic    Social History     Socioeconomic History    Marital status:      Spouse name: Not on file    Number of children: Not on file    Years of education: Not on file    Highest education level: Not on file   Occupational History    Not on file   Social Needs    Financial resource strain: Not on file    Food insecurity     Worry: Not on file     Inability: Not on file   [address] needs     Medical: Not on file     Non-medical: Not on file   Tobacco Use    Smoking status: Former Smoker     Packs/day: 1.00     Years: 40.00     Pack years: 40.00     Types: Cigarettes     Last attempt to quit: 2015     Years since quittin.2    Smokeless tobacco: Never Used   Substance and Sexual Activity    Alcohol use:  Yes     Alcohol/week: 15.0 standard drinks     Types: 5 Cans of beer, 10 Shots of Skin is warm and dry. No rash or nodules on the exposed extremities. Psychiatric: Normal mood and affect. Behavior is normal.  No anxiety. Neurologic: Alert, awake and oriented. PERRL. Speech fluent        Data:     Imaging:  I have reviewed radiology images personally. CT Chest WO Contrast    (Results Pending)     Ct Chest W Wo Contrast    Result Date: 7/9/2020  EXAMINATION: CT OF THE CHEST WITH AND WITHOUT CONTRAST; CT ABDOMEN WITH AND WITHOUT CONTRAST 7/9/2020 7:55 am TECHNIQUE: CT of the chest was performed without and with the administration of intravenous contrast. Multiplanar reformatted images are provided for review. Dose modulation, iterative reconstruction, and/or weight based adjustment of the mA/kV was utilized to reduce the radiation dose to as low as reasonably achievable.; CT of the abdomen was performed without and with the administration of intravenous contrast. Multiplanar reformatted images are provided for review. Dose modulation, iterative reconstruction, and/or weight based adjustment of the mA/kV was utilized to reduce the radiation dose to as low as reasonably achievable. COMPARISON: 05/19/2020 and 09/11/2012 HISTORY: ORDERING SYSTEM PROVIDED HISTORY: Essential hypertension TECHNOLOGIST PROVIDED HISTORY: Reason for exam:->mediastinal lymphadenopathy, right lung abnormal, left renal cyst Reason for Exam: essential hypertension, chest pain, Acuity: Chronic Type of Exam: Initial Additional signs and symptoms: essential hypertension, chest pain, FINDINGS: Chest: Mediastinum: Coronary artery calcifications are a marker of atherosclerosis. Scattered mediastinal lymph nodes are not pathologic by CT criteria. A small to moderate amount of ascites is present in the middle mediastinum adjacent to the distal esophagus. Lungs/pleura: The airway is patent. There is no pneumothorax or pleural effusion. Mild to moderate emphysema involves the bilateral upper lungs.  There is biapical and bibasilar scarring. There is a 3 mm solid left upper lobe pulmonary nodule. Soft Tissues/Bones: Degenerative changes involve the thoracic spine. Abdomen: Organs: The spleen, pancreas, adrenal glands and kidneys are unremarkable. There is a left midpole simple renal cyst. The liver is diffusely low in attenuation consistent hepatic steatosis. GI/Bowel: There is no bowel obstruction. Peritoneum/Retroperitoneum: There is no free fluid or adenopathy. Moderate atherosclerosis involves the abdominal aorta. Bones/Soft Tissues: Degenerative changes involve the lumbar spine. Status post posterior decompression stabilization of L3-L5. 1. Hepatic steatosis. 2. Nonspecific small to moderate amount of fluid in the middle mediastinum adjacent to the distal esophagus could be due to esophagitis. 3. 3 mm solid left upper lobe pulmonary nodule. RECOMMENDATION: Fleischner Society guidelines for follow-up and management of incidentally detected pulmonary nodules: Single Solid Nodule: Nodule size less than 6 mm In a low-risk patient, no routine follow-up. In a high-risk patient, optional CT at 12 months. Ct Abdomen W Wo Contrast Additional Contrast? None    Result Date: 7/9/2020  EXAMINATION: CT OF THE CHEST WITH AND WITHOUT CONTRAST; CT ABDOMEN WITH AND WITHOUT CONTRAST 7/9/2020 7:55 am TECHNIQUE: CT of the chest was performed without and with the administration of intravenous contrast. Multiplanar reformatted images are provided for review. Dose modulation, iterative reconstruction, and/or weight based adjustment of the mA/kV was utilized to reduce the radiation dose to as low as reasonably achievable.; CT of the abdomen was performed without and with the administration of intravenous contrast. Multiplanar reformatted images are provided for review. Dose modulation, iterative reconstruction, and/or weight based adjustment of the mA/kV was utilized to reduce the radiation dose to as low as reasonably achievable.  COMPARISON: 05/19/2020 and 09/11/2012 HISTORY: ORDERING SYSTEM PROVIDED HISTORY: Essential hypertension TECHNOLOGIST PROVIDED HISTORY: Reason for exam:->mediastinal lymphadenopathy, right lung abnormal, left renal cyst Reason for Exam: essential hypertension, chest pain, Acuity: Chronic Type of Exam: Initial Additional signs and symptoms: essential hypertension, chest pain, FINDINGS: Chest: Mediastinum: Coronary artery calcifications are a marker of atherosclerosis. Scattered mediastinal lymph nodes are not pathologic by CT criteria. A small to moderate amount of ascites is present in the middle mediastinum adjacent to the distal esophagus. Lungs/pleura: The airway is patent. There is no pneumothorax or pleural effusion. Mild to moderate emphysema involves the bilateral upper lungs. There is biapical and bibasilar scarring. There is a 3 mm solid left upper lobe pulmonary nodule. Soft Tissues/Bones: Degenerative changes involve the thoracic spine. Abdomen: Organs: The spleen, pancreas, adrenal glands and kidneys are unremarkable. There is a left midpole simple renal cyst. The liver is diffusely low in attenuation consistent hepatic steatosis. GI/Bowel: There is no bowel obstruction. Peritoneum/Retroperitoneum: There is no free fluid or adenopathy. Moderate atherosclerosis involves the abdominal aorta. Bones/Soft Tissues: Degenerative changes involve the lumbar spine. Status post posterior decompression stabilization of L3-L5. 1. Hepatic steatosis. 2. Nonspecific small to moderate amount of fluid in the middle mediastinum adjacent to the distal esophagus could be due to esophagitis. 3. 3 mm solid left upper lobe pulmonary nodule. RECOMMENDATION: Fleischner Society guidelines for follow-up and management of incidentally detected pulmonary nodules: Single Solid Nodule: Nodule size less than 6 mm In a low-risk patient, no routine follow-up. In a high-risk patient, optional CT at 12 months. Assessment:    1.  Pulmonary nodule    - CT Chest WO Contrast; Future    2. Lung nodule      3. Panlobular emphysema ([address]      4. Former smoker      5. Alcohol use      6. Weight loss      7.  Hepatic steatosis          Plan:   · Patient and family were told about the clinical status including auscultation and implications  · Patient and family were shown the CT scan of the chest along with findings, differential diagnosis and implications  · Patient has panlobular emphysema along with that patient has a 3 mm lung nodule which is not specific and not of any immediate, concern  · Patient has monspecific small to moderate amount of fluid in the middle mediastinum adjacent to the distal esophagus could be due to esophagitis patient has had decrease in appetite along with decreased weight which needs to be evaluated-patient is going for GI evaluation this week especially in the view that patient's weight loss and appetite loss is significant  · Patient may require EUS-GI to decide upon that  · Not sure if patient needs any mediastinoscopy at this time  · Patient's recent cardiac stress test report was evaluated and was negative  · Patient was told about hepatic steatosis  · Patient states that he may not be able to leave alcohol drinking  · Patient was congratulated on stopping cigarette smoking  · Patient's lung nodule is not significant at this time which needs any intervention  · Patient needs a repeat CT of the chest in 1 year time to assess nodule characteristics  · Patient does not have any increasing respiratory distress or any wheezing and use of rescue inhaler is limited  · Would not be inclined to do any PFT or add any inhaler to patient's regimen at this time  · Patient and family were told about the constant symptoms of concern which would warrant earlier evaluation  · Patient's further treatment depending on patient's clinical status, GI evaluations and repeat CT of the chest  · Patient to follow-up after the CT of the chest in 1 year time or earlier if required

## 2020-08-03 ENCOUNTER — HOSPITAL ENCOUNTER (OUTPATIENT)
Age: 60
Discharge: HOME | End: 2020-08-03
Payer: MEDICARE

## 2020-08-03 ENCOUNTER — INITIAL CONSULT (OUTPATIENT)
Dept: GASTROENTEROLOGY | Age: 60
End: 2020-08-03
Payer: MEDICARE

## 2020-08-03 VITALS
BODY MASS INDEX: 20.82 KG/M2 | SYSTOLIC BLOOD PRESSURE: 112 MMHG | WEIGHT: 180.2 LBS | DIASTOLIC BLOOD PRESSURE: 64 MMHG | TEMPERATURE: 98 F

## 2020-08-03 LAB
CREAT SERPL-MCNC: 0.6 MG/DL (ref 0.8–1.3)
FERRITIN: 307.9 NG/ML (ref 30–400)
GFR AFRICAN AMERICAN: >60
GFR NON-AFRICAN AMERICAN: >60
HAV IGM SER IA-ACNC: NORMAL
HEPATITIS B CORE IGM ANTIBODY: NORMAL
HEPATITIS B SURFACE ANTIGEN INTERPRETATION: NORMAL
HEPATITIS C ANTIBODY INTERPRETATION: NORMAL
IGA: 530 MG/DL (ref 70–400)
INR BLD: 1.07 (ref 0.86–1.14)
IRON SATURATION: 89 % (ref 20–50)
IRON: 298 UG/DL (ref 59–158)
PROTHROMBIN TIME: 12.4 SEC (ref 10–13.2)
TOTAL IRON BINDING CAPACITY: 335 UG/DL (ref 260–445)
TSH REFLEX: 0.97 UIU/ML (ref 0.27–4.2)

## 2020-08-03 PROCEDURE — 82784 ASSAY IGA/IGD/IGG/IGM EACH: CPT

## 2020-08-03 PROCEDURE — 83540 ASSAY OF IRON: CPT

## 2020-08-03 PROCEDURE — 99204 OFFICE O/P NEW MOD 45 MIN: CPT | Performed by: INTERNAL MEDICINE

## 2020-08-03 PROCEDURE — 82728 ASSAY OF FERRITIN: CPT

## 2020-08-03 PROCEDURE — 83516 IMMUNOASSAY NONANTIBODY: CPT

## 2020-08-03 PROCEDURE — 80074 ACUTE HEPATITIS PANEL: CPT

## 2020-08-03 PROCEDURE — 36415 COLL VENOUS BLD VENIPUNCTURE: CPT

## 2020-08-03 PROCEDURE — 85610 PROTHROMBIN TIME: CPT

## 2020-08-03 PROCEDURE — 83550 IRON BINDING TEST: CPT

## 2020-08-03 PROCEDURE — 82565 ASSAY OF CREATININE: CPT

## 2020-08-03 PROCEDURE — 82390 ASSAY OF CERULOPLASMIN: CPT

## 2020-08-03 PROCEDURE — 84443 ASSAY THYROID STIM HORMONE: CPT

## 2020-08-03 NOTE — LETTER
[address] ,  [address] [facility]  Phone: [phone]   KWU:[phone]  [address] [place] [address],  [facility] [address]  [place], [facility]  Phone: [phone]   WYX:[phone]    08/03/20    Patient:Maurizio Arroyo  MR Number:<[id number]>  YOB: 1960  Date of Visit:8/3/20    Dear Dr. Betsy Rosa, DO    Thank you for the request for consultation for Vikas Houser to me for the evaluation of   Chief Complaint   Patient presents with    New Patient     Fluid in chest near distal esophagus - pulmonary for nodules and mild to moderate emphysema, abnormal chest x-ray   . Below are the relevant portions of my assessment and plan of care. FINAL DIAGNOSIS/Assessment   Diagnosis Orders   1. Elevated LFTs  Catia Titer IgG by IFA Reflex    Ceruloplasmin    F-actin (smooth muscle) antibody w/ reflex to titer    Ferritin    Hepatitis Panel, Acute    IgA    Iron and TIBC    Mitochondrial antibodies, M2    Protime-INR    Tissue Transglutaminase, IgA    TSH with Reflex   2. Abnormal finding on GI tract imaging     3. Abnormal blood chemistry  Ferritin    Iron and TIBC   4. Weight loss  TSH with Reflex       VISIT ORDERS/Plan  Orders Placed This Encounter   Procedures    Catia Titer IgG by IFA Reflex     Standing Status:   Future     Standing Expiration Date:   9/3/2020    Ceruloplasmin     Standing Status:   Future     Standing Expiration Date:   9/3/2020    F-actin (smooth muscle) antibody w/ reflex to titer     Standing Status:   Future     Standing Expiration Date:   9/3/2020    Ferritin     Standing Status:   Future     Standing Expiration Date:   9/3/2020    Hepatitis Panel, Acute     Standing Status:   Future     Standing Expiration Date:   9/3/2020    IgA     Standing Status:   Future     Standing Expiration Date:   9/3/2020    Iron and TIBC     Standing Status:   Future     Standing Expiration Date:   9/3/2020     Order Specific Question:   Is Patient Fasting?

## 2020-08-03 NOTE — LETTER
[address] ,  [address] [facility]  Phone: [phone]   AUS:[phone]  [address] [place] [address],  [facility] [address]  [place], [address] [place] [address]  Phone: [phone]   VFZ:[phone]    08/03/20    Patient:Maurizio Burt  MR Number:<[id number]>  YOB: 1960  Date of Visit:8/3/20    Dear Dr. Angela Savage, DO    Thank you for the request for consultation for [facility] to me for the evaluation of   Chief Complaint   Patient presents with    New Patient     Fluid in chest near distal esophagus - pulmonary for nodules and mild to moderate emphysema, abnormal chest x-ray   . Below are the relevant portions of my assessment and plan of care. FINAL DIAGNOSIS/Assessment   Diagnosis Orders   1. Elevated LFTs  Ceruloplasmin    F-actin (smooth muscle) antibody w/ reflex to titer    Ferritin    Hepatitis Panel, Acute    IgA    Iron and TIBC    Mitochondrial antibodies, M2    Protime-INR    Tissue Transglutaminase, IgA    TSH with Reflex    HOMERO   2. Abnormal finding on GI tract imaging     3. Abnormal blood chemistry  Ferritin    Iron and TIBC   4. Weight loss  TSH with Reflex   5. Hereditary hemochromatosis (HCC)  Ferritin    Iron and TIBC   6.  Blood loss anemia         VISIT ORDERS/Plan  Orders Placed This Encounter   Procedures    Ceruloplasmin     Standing Status:   Future     Number of Occurrences:   1     Standing Expiration Date:   9/3/2020    F-actin (smooth muscle) antibody w/ reflex to titer     Standing Status:   Future     Number of Occurrences:   1     Standing Expiration Date:   9/3/2020    Ferritin     Standing Status:   Future     Number of Occurrences:   1     Standing Expiration Date:   9/3/2020    Hepatitis Panel, Acute     Standing Status:   Future     Number of Occurrences:   1     Standing Expiration Date:   9/3/2020    IgA     Standing Status:   Future     Number of Occurrences:   1     Standing Expiration Date:   9/3/2020    Iron and TIBC

## 2020-08-03 NOTE — PATIENT INSTRUCTIONS
[address] ,  [address] [facility]  Phone: [phone]   VIX:[phone]  [address] [facility],  [address] [facility], [address] [place] [address]  Phone: [phone]   IOQ:[phone]    CIRRHOSIS OVERVIEW - Cirrhosis is the term used to describe a diseased liver that has been severely scarred, usually due to many years of continuous injury. The most common causes include longstanding alcohol abuse, chronic hepatitis B or hepatitis C, and nonalcoholic steatohepatitis (a condition in which fat builds up in the liver, and the liver becomes inflamed). Although cirrhosis cannot be cured, there are a number of things you can do to prevent the disease from worsening. This topic discusses the symptoms, diagnosis, and treatment of cirrhosis. Related topics are discussed separately. (See \"Patient information: Hepatitis B\" and \"Patient information: Hepatitis C\" and \"Patient information: Alcohol use - when is drinking a problem? \".)    CIRRHOSIS CAUSES - The liver is a large organ (weighing about three pounds) that is located in the right upper abdomen, beneath the rib cage (figure 1). It performs many functions that are essential to life. The liver is able to repair itself when it has been injured. However, the process of healing involves the creation of scar tissue. Thus, repeated or continuous injury to the liver (such as occurs with heavy alcohol use) can cause significant scarring in the liver. The body is able to tolerate a partially scarred liver without serious consequences. Eventually, the scarring can become so severe that the liver is no longer able to perform its normal functions. Some of the most common causes of liver injury include:        Longstanding alcohol abuse (see \"Patient information: Alcohol use - when is drinking a problem? \")      Chronic hepatitis (B or C) (see \"Patient information: Hepatitis B\" and \"Patient information: Hepatitis C\")      Nonalcoholic increased risk of infections. Malnutrition is common in patients with cirrhosis. Malnutrition can cause loss of muscle in various areas of the body. Many people with advanced cirrhosis have jaundice (yellowed skin or whites of the eyes). People with cirrhosis are at increased risk for developing liver cancer (hepatocellular carcinoma). Cirrhosis can cause fatigue and in some cases itching. CIRRHOSIS DIAGNOSIS - Testing is performed to confirm the diagnosis of cirrhosis, determine the underlying cause, determine the severity of cirrhosis, and monitor for complications. (See \"Diagnostic approach to the patient with cirrhosis\". )    Liver biopsy - The best way to confirm the diagnosis of cirrhosis is a liver biopsy. This procedure is discussed in depth in a separate article. (See \"Patient information: Liver biopsy\". )    Imaging tests - Imaging tests, such as ultrasound, may be recommended to evaluate the condition of the liver or determine if there are cirrhosis-related complications. However, imaging tests are not usually performed to diagnose cirrhosis. Blood tests - Blood tests may be performed to help determine the underlying cause of cirrhosis and to monitor the liver function over time. CIRRHOSIS TREATMENT - There have been major advances in the treatment of cirrhosis in the past few decades. In particular, it has become easier to recognize, prevent, and treat complications of cirrhosis. (See \"Overview of the complications, prognosis, and management of cirrhosis\". )    People with cirrhosis should see their healthcare provider regularly for monitoring and treatment of cirrhosis complications. Although cirrhosis cannot be cured, several treatments are available to minimize cirrhosis-related complications. Other treatments are recommended to help prevent complications.     Avoid substances that can injure the liver - People with cirrhosis should avoid consuming substances that can further damage the liver. The most common of these is alcohol. You should talk to your healthcare provider before taking any new medication (including prescription and non-prescription drugs, herbs, vitamins, or dietary supplements). Acetaminophen (Tylenol®) is a nonprescription medication that can further injure the liver in people with cirrhosis. The exact amount of acetaminophen that is safe in cirrhosis is uncertain; some experts recommend that patients not use more than 650 mg per dose every 4 to 6 hours, with no more than 2000 mg per day. However, even low doses may not be safe for those who drink alcohol. Use of acetaminophen should be discussed with a healthcare provider. Screen for and treat varices - People with cirrhosis should undergo an upper endoscopy to determine if varices (expanded blood vessels) are present in the esophagus, the tube that connects the mouth and stomach (figure 2). Screening for and treatment of varices is discussed in detail in a separate topic review. (See \"Patient information: Screening for esophageal varices\" and \"Prediction of variceal hemorrhage in patients with cirrhosis\" and \"Primary prophylaxis against variceal hemorrhage in patients with cirrhosis\".)    Vaccination - People with cirrhosis should be vaccinated against hepatitis A and B. Pneumococcal vaccine and yearly influenza vaccine are also recommended. (See \"Patient information: Adult vaccines\" and \"Patient information: Influenza prevention\" and \"Patient information: Pneumonia prevention\". )    Treat ascites and edema - Ascites and edema can lead to complications, particularly infection. Ascites can also cause a person to feel short of breath or full. Thus, treatment is usually recommended to reduce the amount of fluid that collects in the lower legs and abdomen. Treatment usually involves taking one or more diuretic pills (fluid pills) and following a low-sodium (salt) diet.  (See 'Dietary advice' below and \"Initial therapy of ascites in patients with cirrhosis\". )    Paracentesis - Some people do not get adequate relief from edema and ascites with fluid pills alone. In this case, periodic drainage of the fluid (paracentesis) may be required. This is done by inserting a needle into the abdomen and withdrawing a large amount of fluid from the space around the abdominal organs. The procedure can usually be performed in a doctor's office. Following paracentesis, it is important to continue taking your diuretic medication and to limit the amount of sodium you consume (see 'Dietary advice' below). TIPS - A TIPS (transjugular intrahepatic portosystemic shunts) procedure may be recommended to treat ascites if diuretics, paracentesis, and changes in diet are not completely successful in relieving ascites. (See \"Indications and contraindications to the use of transjugular intrahepatic portosystemic shunts\".)    During the procedure, a radiologist places a device within the liver to reduce the blood pressure in the portal vein (in the liver) (figure 2). The procedure is usually performed with local anesthesia and sedation, and takes between one and three hours. Most patients remain in the hospital for one to three nights after the procedure. Screen for hepatocellular carcinoma - People with cirrhosis should have tests to detect hepatocellular carcinoma (cancer of the liver). Testing usually requires an ultrasound examination of the liver every six months. Consider liver transplantation - Not all patients with cirrhosis will require a liver transplantation, and many are not eligible for one. However, because the waiting list for liver transplantation is lengthy (up to two years in some regions), it is important to know if liver transplantation is a reasonable option while you are still relatively healthy.  (See 'Liver transplantation for cirrhosis' below.)    Screen for encephalopathy - People with cirrhosis can develop confusion, which is sometimes subtle. Detecting confusion is important since treatment is available, and the confusion itself can lead to serious problems (eg, an automobile accident). A change in the sleep pattern (insomnia or sleeping too much) may be an early sign. Treatment may include a medication called lactulose (which will cause softening of the stool) or an antibiotic. Dietary advice - People with advanced cirrhosis may require a specialized diet that includes lower amounts of salt. Salt restriction is usually recommended for people with early cirrhosis who tend to accumulate fluid. Protein restriction is usually not necessary. A healthcare provider or dietitian can help to determine if dietary changes are needed. A detailed discussion about a low-sodium diet is available separately. (See \"Patient information: Low sodium diet\". )    The benefit of vitamins, antioxidants and other supplements on the underlying liver disease has not been established. Several herbal therapies have been reported as having a benefit in patients with cirrhosis. None have clearly been proven to be effective, although some continue to be studied. Most experts do not recommend vitamins, herbs, or other supplements for people with cirrhosis. Exercise - Exercise is generally safe for people without advanced-stage cirrhosis. Exercise may increase the risk of variceal bleeding in patients with advanced disease (such as those who have ascites or varices). Thus, check with your healthcare provider regarding the risks and benefits of exercise. LIVER TRANSPLANTATION FOR CIRRHOSIS - Liver transplantation involves replacing a diseased liver with a healthy liver. The majority of donated livers come from people who have suffered brain death for one reason or another. More recently, living donors have been able to donate a portion of their liver.     More than 80 percent of people will be alive one year after liver transplantation, and

## 2020-08-03 NOTE — PROGRESS NOTES
[address] [facility],  [address] [place] [address] Ave  [place], [address] [facility]  Phone: [phone]   [facility]     Chief Complaint   Patient presents with    New Patient     Fluid in chest near distal esophagus - pulmonary for nodules and mild to moderate emphysema, abnormal chest x-ray       HPI     Thank you Hadley Greenfield DO for asking me to see Grace Patten in consultation. He is a  [2] White [1] 62 y.o. . male seen with his wife who presents with the following GI complaints:    Grace Patten  Is referred following an abnormal CT scan. Thinks his mother had LANE cirrhosis. He has chronic transaminase elevation with AST>ALT consistent with alcoholic hepatitis. He drinks 6-8 drinks daily chronically. Has been diagnosed with hereditary hemochromatosis and had phlebotomy through hematology. Became anemic and has not had a blood draw for over a year yet remains anemic. There is thrombocytopenia. Denies overt bleeding. Has lost 15lbs in 6 months. On protonix for reflux, previously zantac. Has seen pulmonary.       Lab Results   Component Value Date    INR 1.07 08/03/2020    INR 1.08 03/12/2018    INR 1.03 03/11/2018    PROTIME 12.4 08/03/2020    PROTIME 12.2 03/12/2018    PROTIME 11.6 03/11/2018     Lab Results   Component Value Date    ALT 59 (H) 05/20/2020    ALT 76 (H) 05/19/2020    ALT 31 03/11/2018     (H) 05/20/2020     (H) 05/19/2020    AST 72 (H) 03/11/2018    ALKPHOS 95 05/20/2020    ALKPHOS 115 05/19/2020    ALKPHOS 86 03/11/2018    BILITOT 1.9 (H) 05/20/2020    BILITOT 1.5 (H) 05/19/2020    BILITOT 1.3 (H) 03/11/2018    HGB 10.8 (L) 05/20/2020    HGB 12.5 (L) 05/19/2020    HGB 13.8 03/11/2018    PLT 99 (L) 05/20/2020     (L) 05/19/2020     (L) 03/11/2018     Lab Results   Component Value Date     05/20/2020    K 3.2 (L) 05/20/2020     05/20/2020    CO2 24 05/20/2020    BUN 6 (L) 05/20/2020    CREATININE 0.6 (L) 08/03/2020    GLUCOSE 93 05/20/2020    CALCIUM 8.5 05/20/2020    PROT 5.9 (L) 05/20/2020    LABALBU 3.5 05/20/2020         CT ABDOMEN W WO CONTRAST Additional Contrast? None  Narrative: EXAMINATION:  CT OF THE CHEST WITH AND WITHOUT CONTRAST; CT ABDOMEN WITH AND WITHOUT  CONTRAST 7/9/2020 7:55 am    TECHNIQUE:  CT of the chest was performed without and with the administration of  intravenous contrast. Multiplanar reformatted images are provided for review. Dose modulation, iterative reconstruction, and/or weight based adjustment of  the mA/kV was utilized to reduce the radiation dose to as low as reasonably  achievable.; CT of the abdomen was performed without and with the  administration of intravenous contrast. Multiplanar reformatted images are  provided for review. Dose modulation, iterative reconstruction, and/or weight  based adjustment of the mA/kV was utilized to reduce the radiation dose to as  low as reasonably achievable. COMPARISON:  05/19/2020 and 09/11/2012    HISTORY:  ORDERING SYSTEM PROVIDED HISTORY: Essential hypertension  TECHNOLOGIST PROVIDED HISTORY:  Reason for exam:->mediastinal lymphadenopathy, right lung abnormal, left  renal cyst  Reason for Exam: essential hypertension, chest pain,  Acuity: Chronic  Type of Exam: Initial  Additional signs and symptoms: essential hypertension, chest pain,    FINDINGS:    Chest:    Mediastinum: Coronary artery calcifications are a marker of atherosclerosis. Scattered mediastinal lymph nodes are not pathologic by CT criteria. A small  to moderate amount of ascites is present in the middle mediastinum adjacent  to the distal esophagus. Lungs/pleura: The airway is patent. There is no pneumothorax or pleural  effusion. Mild to moderate emphysema involves the bilateral upper lungs. There is biapical and bibasilar scarring. There is a 3 mm solid left upper lobe pulmonary nodule.     Soft Tissues/Bones: Degenerative changes involve the thoracic spine.    Abdomen:    Organs: The spleen, pancreas, adrenal glands and kidneys are unremarkable. There is a left midpole simple renal cyst.    The liver is diffusely low in attenuation consistent hepatic steatosis. GI/Bowel: There is no bowel obstruction. Peritoneum/Retroperitoneum: There is no free fluid or adenopathy. Moderate  atherosclerosis involves the abdominal aorta. Bones/Soft Tissues: Degenerative changes involve the lumbar spine. Status  post posterior decompression stabilization of L3-L5. Impression: 1. Hepatic steatosis. 2. Nonspecific small to moderate amount of fluid in the middle mediastinum  adjacent to the distal esophagus could be due to esophagitis. 3. 3 mm solid left upper lobe pulmonary nodule. RECOMMENDATION:  Fleischner Society guidelines for follow-up and management of incidentally  detected pulmonary nodules:    Single Solid Nodule:    Nodule size less than 6 mm  In a low-risk patient, no routine follow-up. In a high-risk patient, optional CT at 12 months. CT CHEST W WO CONTRAST  Narrative: EXAMINATION:  CT OF THE CHEST WITH AND WITHOUT CONTRAST; CT ABDOMEN WITH AND WITHOUT  CONTRAST 7/9/2020 7:55 am    TECHNIQUE:  CT of the chest was performed without and with the administration of  intravenous contrast. Multiplanar reformatted images are provided for review. Dose modulation, iterative reconstruction, and/or weight based adjustment of  the mA/kV was utilized to reduce the radiation dose to as low as reasonably  achievable.; CT of the abdomen was performed without and with the  administration of intravenous contrast. Multiplanar reformatted images are  provided for review. Dose modulation, iterative reconstruction, and/or weight  based adjustment of the mA/kV was utilized to reduce the radiation dose to as  low as reasonably achievable.     COMPARISON:  05/19/2020 and 09/11/2012    HISTORY:  ORDERING SYSTEM PROVIDED HISTORY: Essential hypertension  TECHNOLOGIST PROVIDED HISTORY:  Reason for exam:->mediastinal lymphadenopathy, right lung abnormal, left  renal cyst  Reason for Exam: essential hypertension, chest pain,  Acuity: Chronic  Type of Exam: Initial  Additional signs and symptoms: essential hypertension, chest pain,    FINDINGS:    Chest:    Mediastinum: Coronary artery calcifications are a marker of atherosclerosis. Scattered mediastinal lymph nodes are not pathologic by CT criteria. A small  to moderate amount of ascites is present in the middle mediastinum adjacent  to the distal esophagus. Lungs/pleura: The airway is patent. There is no pneumothorax or pleural  effusion. Mild to moderate emphysema involves the bilateral upper lungs. There is biapical and bibasilar scarring. There is a 3 mm solid left upper lobe pulmonary nodule. Soft Tissues/Bones: Degenerative changes involve the thoracic spine. Abdomen:    Organs: The spleen, pancreas, adrenal glands and kidneys are unremarkable. There is a left midpole simple renal cyst.    The liver is diffusely low in attenuation consistent hepatic steatosis. GI/Bowel: There is no bowel obstruction. Peritoneum/Retroperitoneum: There is no free fluid or adenopathy. Moderate  atherosclerosis involves the abdominal aorta. Bones/Soft Tissues: Degenerative changes involve the lumbar spine. Status  post posterior decompression stabilization of L3-L5. Impression: 1. Hepatic steatosis. 2. Nonspecific small to moderate amount of fluid in the middle mediastinum  adjacent to the distal esophagus could be due to esophagitis. 3. 3 mm solid left upper lobe pulmonary nodule. RECOMMENDATION:  Fleischner Society guidelines for follow-up and management of incidentally  detected pulmonary nodules:    Single Solid Nodule:    Nodule size less than 6 mm  In a low-risk patient, no routine follow-up. In a high-risk patient, optional CT at 12 months.       HPI elements: location, severity, timing, modifying factors, quality, duration, context and associated signs/symptoms. Last Encounter Reviewed:  Pertinent PMH, FH, SH is reviewed below. Last EGD: 1yo NA  Last Colonoscopy: 1yo, NA    Review of available records reveals:   Wt Readings from Last 50 Encounters:   08/03/20 180 lb 3.2 oz (81.7 kg)   07/14/20 181 lb (82.1 kg)   06/29/20 186 lb 3.2 oz (84.5 kg)   05/20/20 181 lb 14.4 oz (82.5 kg)   03/11/18 191 lb 6.4 oz (86.8 kg)   06/13/16 192 lb (87.1 kg)   06/02/16 195 lb (88.5 kg)   05/26/16 194 lb (88 kg)   12/23/15 191 lb 3.2 oz (86.7 kg)   12/08/15 193 lb 4.8 oz (87.7 kg)   07/31/15 196 lb 3.2 oz (89 kg)   06/02/15 204 lb (92.5 kg)   11/03/14 198 lb 9.6 oz (90.1 kg)   06/27/14 195 lb (88.5 kg)   05/27/14 195 lb (88.5 kg)   12/21/13 202 lb (91.6 kg)   06/20/13 197 lb (89.4 kg)       No components found for: HGBA1C  BP Readings from Last 3 Encounters:   08/03/20 112/64   07/14/20 118/82   06/29/20 116/74     Health Maintenance   Topic Date Due    Hepatitis C screen  1960    Pneumococcal 0-64 years Vaccine (1 of 1 - PPSV23) 12/17/1964    HIV screen  12/17/1973    PSA counseling  06/06/2017    Annual Wellness Visit (AWV)  06/19/2019    Colon cancer screen colonoscopy  07/14/2019    Flu vaccine (1) 09/01/2020    Potassium monitoring  05/20/2021    Creatinine monitoring  05/20/2021    Low dose CT lung screening  07/09/2021    Lipid screen  05/20/2025    DTaP/Tdap/Td vaccine (2 - Td) 05/23/2026    Shingles Vaccine  Completed    Hepatitis A vaccine  Aged Out    Hepatitis B vaccine  Aged Out    Hib vaccine  Aged Out    Meningococcal (ACWY) vaccine  Aged Out       No components found for: [employer]     PAST MEDICAL HISTORY     Past Medical History:   Diagnosis Date    Hypertension     Hyperuricemia     Lumbar disc disease     Prostate cancer ([facility] [address] 2006    removal     FAMILY HISTORY     Family History   Problem Relation Age of Onset    Cancer Mother         lung    Cancer Sister         breast    Cancer Brother         lymphoma    Parkinsonism Father      SOCIAL HISTORY     Social History     Socioeconomic History    Marital status:      Spouse name: Not on file    Number of children: Not on file    Years of education: Not on file    Highest education level: Not on file   Occupational History    Not on file   Social Needs    Financial resource strain: Not on file    Food insecurity     Worry: Not on file     Inability: Not on file    Transportation needs     Medical: Not on file     Non-medical: Not on file   Tobacco Use    Smoking status: Former Smoker     Packs/day: 1.00     Years: 40.00     Pack years: 40.00     Types: Cigarettes     Last attempt to quit: 2015     Years since quittin.3    Smokeless tobacco: Never Used   Substance and Sexual Activity    Alcohol use:  Yes     Alcohol/week: 15.0 standard drinks     Types: 5 Cans of beer, 10 Shots of liquor per week     Comment: Drinks rum and coke daily    Drug use: No    Sexual activity: Not Currently   Lifestyle    Physical activity     Days per week: Not on file     Minutes per session: Not on file    Stress: Not on file   Relationships    Social connections     Talks on phone: Not on file     Gets together: Not on file     Attends Yazdanism service: Not on file     Active member of club or organization: Not on file     Attends meetings of clubs or organizations: Not on file     Relationship status: Not on file    Intimate partner violence     Fear of current or ex partner: Not on file     Emotionally abused: Not on file     Physically abused: Not on file     Forced sexual activity: Not on file   Other Topics Concern    Not on file   Social History Narrative    Not on file     SURGICAL HISTORY     Past Surgical History:   Procedure Laterality Date    COLONOSCOPY      2019    CYST REMOVAL      scrotum    ELBOW SURGERY Right     Ulnar nerve transposition    KNEE SURGERY Right 2009    Arthroscopy    PROSTATE SURGERY  2006    Dr. Keith Sheffield      x 3  in   2011     [address]   (This list may include medications prescribed during this encounter as epic can not insert only the list prior to this encounter.)  Current Outpatient Rx   Medication Sig Dispense Refill    Multiple Vitamins-Minerals (MULTIVITAMIN ADULTS PO) Take by mouth daily      pantoprazole (PROTONIX) 40 MG tablet       carvedilol (COREG) 3.125 MG tablet Take 1 tablet by mouth 2 times daily (with meals) 60 tablet 3    methocarbamol (ROBAXIN) 750 MG tablet TAKE ONE TABLET BY MOUTH THREE TIMES A DAY 90 tablet 2    oxyCODONE-acetaminophen (PERCOCET) 5-325 MG per tablet Take 1 tablet by mouth 3 times daily as needed for Pain      allopurinol (ZYLOPRIM) 300 MG tablet TAKE ONE TABLET BY MOUTH DAILY 90 tablet 2    lisinopril (PRINIVIL;ZESTRIL) 20 MG tablet TAKE ONE TABLET BY MOUTH DAILY 90 tablet 3    Cholecalciferol (VITAMIN D3) 2000 UNITS CAPS Take 2,000 Units by mouth daily. ALLERGIES     Allergies   Allergen Reactions    Asa [Aspirin]      Pt throws up     IMMUNIZATIONS     Immunization History   Administered Date(s) Administered    Fluzone Quad 3yrs and older 11/15/2011    Influenza Virus Vaccine 11/03/2014, 12/08/2015    Tdap (Boostrix, Adacel) 05/23/2016    Zoster Live (Zostavax) 12/10/2014     REVIEW OF SYSTEMS   See HPI for further details and pertinent postiives. Negative for the following:  Constitutional: Negative for weight change. Negative for appetite change and fatigue. HENT: Negative for nosebleeds, sore throat, mouth sores, and voice change. Respiratory: Negative for cough, choking and chest tightness. Cardiovascular: Negative for chest pain   Gastrointestinal: See HPI  Musculoskeletal: Negative for arthralgias. Skin: Negative for pallor. Neurological: Negative for weakness and light-headedness. Hematological: Negative for adenopathy. Does not bruise/bleed easily.    Psychiatric/Behavioral: Negative Expiration Date:   9/3/2020    IgA     Standing Status:   Future     Number of Occurrences:   1     Standing Expiration Date:   9/3/2020    Iron and TIBC     Standing Status:   Future     Number of Occurrences:   1     Standing Expiration Date:   9/3/2020     Order Specific Question:   Is Patient Fasting? Answer:   yes     Order Specific Question:   No of Hours? Answer:   8    Mitochondrial antibodies, M2     Standing Status:   Future     Number of Occurrences:   1     Standing Expiration Date:   9/3/2020    Protime-INR     Standing Status:   Future     Number of Occurrences:   1     Standing Expiration Date:   9/3/2020     Order Specific Question:   Daily Coumadin Dose? Answer:   none-cirrhosis    Tissue Transglutaminase, IgA     Standing Status:   Future     Number of Occurrences:   1     Standing Expiration Date:   9/3/2020    TSH with Reflex     Standing Status:   Future     Number of Occurrences:   1     Standing Expiration Date:   9/3/2020    CATIA     Standing Status:   Future     Standing Expiration Date:   9/3/2020     Ara Morillo was seen today for new patient. Diagnoses and all orders for this visit:    Elevated LFTs  -     Cancel: Catia Titer IgG by IFA Reflex; Future  -     Ceruloplasmin; Future  -     F-actin (smooth muscle) antibody w/ reflex to titer; Future  -     Ferritin; Future  -     Hepatitis Panel, Acute; Future  -     IgA; Future  -     Iron and TIBC; Future  -     Mitochondrial antibodies, M2; Future  -     Protime-INR; Future  -     Tissue Transglutaminase, IgA; Future  -     TSH with Reflex; Future  -     CATIA; Future    Abnormal finding on GI tract imaging    Abnormal blood chemistry  -     Ferritin; Future  -     Iron and TIBC; Future    Weight loss  -     TSH with Reflex; Future    Hereditary hemochromatosis ([address]  -     Ferritin; Future  -     Iron and TIBC; Future    Blood loss anemia    Will most likely need an EGD. Have requested previous endoscopy records.   Cirrhosis suspected and will plan fibroscan or liver biopsy after above workup. ORDERED FUTURE/PENDING TESTS     Lab Frequency Next Occurrence   MRI CARDIAC W WO CONTRAST Once 05/26/2021   CREATININE, SERUM Once 07/07/2021   CT Chest WO Contrast Once 07/14/2021       FOLLOWUP   Return for after ordered tests, EGD.           Akil Wallace 8/3/20 2:59 PM EDT    CC:  Suleman Alvarez DO

## 2020-08-04 LAB — TISSUE TRANSGLUTAMINASE IGA: <0.5 U/ML (ref 0–14)

## 2020-08-05 LAB
CERULOPLASMIN: 23 MG/DL (ref 15–30)
F-ACTIN AB IGG: 6 UNITS (ref 0–19)
MITOCHONDRIAL M2 AB, IGG: 5.2 UNITS (ref 0–20)

## 2020-08-13 NOTE — RESULT ENCOUNTER NOTE
Please call patient with results. Should get back in touch with hematology where he has got previous phlebotomy for hemochromatosis.

## 2020-10-22 ENCOUNTER — OFFICE VISIT (OUTPATIENT)
Dept: CARDIOLOGY CLINIC | Age: 60
End: 2020-10-22
Payer: MEDICARE

## 2020-10-22 VITALS
HEIGHT: 78 IN | HEART RATE: 90 BPM | WEIGHT: 182.5 LBS | OXYGEN SATURATION: 96 % | BODY MASS INDEX: 21.12 KG/M2 | DIASTOLIC BLOOD PRESSURE: 62 MMHG | SYSTOLIC BLOOD PRESSURE: 110 MMHG

## 2020-10-22 PROCEDURE — 99213 OFFICE O/P EST LOW 20 MIN: CPT | Performed by: NURSE PRACTITIONER

## 2020-10-22 RX ORDER — CARVEDILOL 3.12 MG/1
3.12 TABLET ORAL 2 TIMES DAILY WITH MEALS
Qty: 180 TABLET | Refills: 3 | Status: SHIPPED | OUTPATIENT
Start: 2020-10-22 | End: 2021-11-02

## 2020-10-22 RX ORDER — LANOLIN ALCOHOL/MO/W.PET/CERES
400 CREAM (GRAM) TOPICAL DAILY
COMMUNITY

## 2020-10-22 ASSESSMENT — ENCOUNTER SYMPTOMS
BACK PAIN: 1
SHORTNESS OF BREATH: 0
COUGH: 1

## 2020-10-22 NOTE — PROGRESS NOTES
[address]   Cardiac Evaluation    Primary Care Doctor:  Krysta Oakley DO    Chief Complaint   Patient presents with    3 Month Follow-Up    Shortness of Breath     ongoing         History of Present Illness:   I had the pleasure of seeing Gemini Diaz in follow up for HTN, chest pain, moderate MR and syncope. He had been started on Coreg for hypertension but this was decreased when he developed syncopal spells. An MRI and CT chest and abdomen reviewed - found abnormalities in lungs and esophagus. He was referred to pulmonary (emphysema stable, lung nodules minimal concern,to recheck in 1 year) and GI (concern of weight loss, abnormal LFT, anemia despite hemachromatosis, suspect cirrhosis, further testing ordered). BP staying 110-120/ 60's. No further chest pain. Has back, neck and knee pain, follows with pain specialist. His weight is down here today but states is slowing gaining back. Appetite is better. He notes some ankle swelling, more so in evening. No longer working. Works around [employer] and for kids. Occasional palpitations, not associated wth shortness of breath or lightheadedness. Gemini Diaz describes symptoms including palpitations, edema but denies chest pain, dyspnea, orthopnea, PND, early saiety, syncope. NYHA:   II  ACC/ AHA Stage:    C    Past Medical History:   has a past medical history of Hypertension, Hyperuricemia, Lumbar disc disease, and Prostate cancer ([address] Surgical History:   has a past surgical history that includes knee surgery (Right, 2009); Elbow surgery (Right, 2004); Prostate surgery (2006); Spine surgery; Colonoscopy (2009); and cyst removal.   Social History:   reports that he quit smoking about 5 years ago. His smoking use included cigarettes. He has a 40.00 pack-year smoking history. He has never used smokeless tobacco. He reports current alcohol use of about 15.0 standard drinks of alcohol per week.  He reports that he does not use drugs. Family History:   Family History   Problem Relation Age of Onset   Chidi Guadalupe Cancer Mother         lung    Cancer Sister         breast    Cancer Brother         lymphoma    Parkinsonism Father        Home Medications:  Prior to Admission medications    Medication Sig Start Date End Date Taking? Authorizing Provider   folic acid (FOLVITE) 255 MCG tablet Take 400 mcg by mouth daily   Yes Historical Provider, MD   Multiple Vitamins-Minerals (MULTIVITAMIN ADULTS PO) Take by mouth daily   Yes Historical Provider, MD   pantoprazole (PROTONIX) 40 MG tablet  4/29/20  Yes Historical Provider, MD   carvedilol (COREG) 3.125 MG tablet Take 1 tablet by mouth 2 times daily (with meals) 6/29/20  Yes BEAR Calhoun - CNP   methocarbamol (ROBAXIN) 750 MG tablet TAKE ONE TABLET BY MOUTH THREE TIMES A DAY 7/28/16  Yes Maurizio Mart DO   oxyCODONE-acetaminophen (PERCOCET) 5-325 MG per tablet Take 1 tablet by mouth 3 times daily as needed for Pain   Yes Historical Provider, MD   allopurinol (ZYLOPRIM) 300 MG tablet TAKE ONE TABLET BY MOUTH DAILY 5/22/16  Yes Maurizio Mart DO   lisinopril (PRINIVIL;ZESTRIL) 20 MG tablet TAKE ONE TABLET BY MOUTH DAILY 3/8/16  Yes Maurizio Mart,    Cholecalciferol (VITAMIN D3) 2000 UNITS CAPS Take 2,000 Units by mouth daily. Yes Historical Provider, MD        Allergies:  Asa [aspirin]     Review of Systems:   Review of Systems   Constitutional: Positive for fatigue. Negative for activity change and appetite change. HENT: Positive for congestion. Negative for nosebleeds. Respiratory: Positive for cough. Negative for shortness of breath. Cardiovascular: Positive for palpitations and leg swelling. Negative for chest pain. Musculoskeletal: Positive for arthralgias and back pain. Neurological: Positive for weakness. Negative for dizziness, syncope and light-headedness. Hematological: Bruises/bleeds easily. Psychiatric/Behavioral: Negative for sleep disturbance. Physical Examination:    Vitals:    10/22/20 1049   BP: 110/62   Pulse: 90   SpO2: 96%   Weight: 182 lb 8 oz (82.8 kg)   Height: 6' 6\" (1.981 m)        Constitutional and General Appearance: Warm and dry, no apparent distress, normal coloration  HEENT:  Normocephalic, atraumatic  Respiratory:  · Normal excursion and expansion without use of accessory muscles  · Resp Auscultation: Clear to auscultation   Cardiovascular:  · The apical impulses not displaced  · Heart tones are crisp and normal  · JVP 8 cm H2O  · Regular rate and rhythm, occasional extra systole, no m/g/r  · Peripheral pulses are symmetrical and full  · There is no clubbing, cyanosis of the extremities.   · No BLE edema  · Pedal Pulses: 2+ and equal   Abdomen:  · No masses or tenderness  · Liver/Spleen: No Abnormalities Noted  Neurological/Psychiatric:  · Alert and oriented in all spheres  · Moves all extremities well  · Exhibits normal gait balance and coordination  · No abnormalities of mood, affect, memory, mentation, or behavior are noted    Lab Data:  Most recent lab results below reviewed in office    CBC:   Lab Results   Component Value Date    WBC 3.0 05/20/2020    WBC 3.4 05/19/2020    WBC 3.5 03/11/2018    RBC 2.71 05/20/2020    RBC 3.16 05/19/2020    RBC 3.64 03/11/2018    HGB 10.8 05/20/2020    HGB 12.5 05/19/2020    HGB 13.8 03/11/2018    HCT 31.0 05/20/2020    HCT 35.7 05/19/2020    HCT 39.8 03/11/2018    .2 05/20/2020    .1 05/19/2020    .2 03/11/2018    RDW 15.6 05/20/2020    RDW 16.1 05/19/2020    RDW 13.2 03/11/2018    PLT 99 05/20/2020     05/19/2020     03/11/2018     BMP:  Lab Results   Component Value Date     05/20/2020     05/19/2020     03/11/2018    K 3.2 05/20/2020    K 3.4 05/19/2020    K 3.5 03/11/2018    K 4.2 06/06/2016    K 4.2 05/28/2015     05/20/2020     05/19/2020    CL 95 03/11/2018    CO2 24 05/20/2020    CO2 26 05/19/2020    CO2 28 03/11/2018 function. The aortic root is dilated.  The descending aorta is dilated.    There is partial fusion of the right and non coronary cusps with restricted motion.  The aortic valve area is 2.1 cm2 by planimetry. Given multiple pulmonary abnormalities, consider contrasted chest CT for further evaluation. FINDINGS:   The left ventricle is normal in size with preserved systolic function.  There is hypertrabeculation of the apex.  There is no evidence of myocardial edema or iron overload. Global, precontrast T1 mapping is within normal limits.  However there is regional abnormality involving the basal inferolateral and inferior segments.  There is a resting perfusion defect involving the mid inferoseptal segment without associated late gadolinium enhancement suggestive of dark ring artifact.  Late gadolinium enhancement is of poor technical quality.  There is no obvious area of late gadolinium enhancement. The left atrium is at the upper limit of normal in size.  There is moderate mitral regurgitation.  The right ventricle is normal in size with preserved systolic function. The right atrium is mildly dilated.  There is mild to moderate tricuspid regurgitation.  The Eustachian valve is visualized.  A patent foramen ovale is present. Fiorella Zuleyka is lipomatous hypertrophy of the interatrial septum.      The aortic root is dilated.  The descending aorta is dilated. Fiorella Zuleyka is partial fusion of the right and non coronary cusps with restricted motion.  The aortic valve area is 2.1 cm2.  The pulmonary artery is normal in size.       The pericardium appears normal.    Mediastinal adenopathy (largest lymph node diameter 12 mm) is present.    There is a small, focal area of infiltrate involving the anterior portion of the right lung base.  There is scarring versus infiltrate in the right posterior lung base adjacent to the vertebral column.  There is degenerative joint disease of the thoracic spine.  A cystic mass is visualized at the lateral aspect of the left kidney (visualized on prior CT).    EVENT MONITOR MAY 2020            STRESS ECHO 5/19/20:     Echo   Images obtained in 41 seconds.   Baseline resting echocardiogram shows normal global LV systolic function   with an ejection fraction of 60% and uniform myocardial segmental wall   motion. Following stress there was uniform augmentation of all myocardial   segments with appropriate hyperdynamic LV systolic response to stress.   [place]   Artifact seen present in EKG during stress limits interpretation.   The stress ECG showed no ischemia at target heart rate. Duke Score of 6 ( low Risk ).     Arrhythmias   No significant events.    Symptoms   There was stress induced shortness of breath.   Symptoms resolved with rest.     March 2018: STRESS MPI   Conclusions        Summary    Normal myocardial perfusion study with normal left ventricular function,    size, and wall motion.    The estimated left ventricular function is 64%.        Assessment:    1. Essential hypertension    2. Chest pain, unspecified type    3. Syncope, unspecified syncope type    4. Coronary artery calcification seen on CT scan    5. Other hemochromatosis    6. Panlobular emphysema ([address]          Plan:   1. No change in current heart/ BP medicines  2. Follow up with me in 1 year       I appreciate the opportunity of cooperating in the care of this individual.    BEAR Quintanilla - RAFY, 10/22/2020, 11:02 AM       QUALITY MEASURES  1. Tobacco Cessation Counseling: NA  2. Retake of BP if >140/90:   NA  3. Documentation to PCP/referring for new patient:  Sent to PCP at close of office visit  4. CAD patient on anti-platelet: NA  5. CAD patient on STATIN therapy:  NA  6.  Patient with CHF and aFib on anticoagulation:  NA

## 2021-03-11 ENCOUNTER — IMMUNIZATION (OUTPATIENT)
Dept: PRIMARY CARE CLINIC | Age: 61
End: 2021-03-11
Payer: MEDICARE

## 2021-03-11 PROCEDURE — 0001A COVID-19, PFIZER VACCINE 30MCG/0.3ML DOSE: CPT | Performed by: FAMILY MEDICINE

## 2021-03-11 PROCEDURE — 91300 COVID-19, PFIZER VACCINE 30MCG/0.3ML DOSE: CPT | Performed by: FAMILY MEDICINE

## 2021-04-01 ENCOUNTER — IMMUNIZATION (OUTPATIENT)
Dept: PRIMARY CARE CLINIC | Age: 61
End: 2021-04-01
Payer: MEDICARE

## 2021-04-01 PROCEDURE — 91300 COVID-19, PFIZER VACCINE 30MCG/0.3ML DOSE: CPT | Performed by: FAMILY MEDICINE

## 2021-04-01 PROCEDURE — 0002A COVID-19, PFIZER VACCINE 30MCG/0.3ML DOSE: CPT | Performed by: FAMILY MEDICINE

## 2021-04-19 ENCOUNTER — HOSPITAL ENCOUNTER (OUTPATIENT)
Dept: NEUROLOGY | Age: 61
Discharge: HOME | End: 2021-04-19
Payer: MEDICARE

## 2021-04-19 DIAGNOSIS — G60.9 NEUROPATHY, PERIPHERAL, HEREDITARY: ICD-10-CM

## 2021-04-19 PROCEDURE — 95885 MUSC TST DONE W/NERV TST LIM: CPT | Performed by: PHYSICAL MEDICINE & REHABILITATION

## 2021-04-19 PROCEDURE — 95909 NRV CNDJ TST 5-6 STUDIES: CPT | Performed by: PHYSICAL MEDICINE & REHABILITATION

## 2021-04-19 NOTE — PROCEDURES
[address]      Electrodiagnostic Report  Test Date:  2021    Patient: Tutu Blood : 1960 Physician: Zulma Perez D.O.   Sex: Male Height:  cm Ref Phys: Moris Stewart DPM     Patient Complaints:  Patient is a 56year-old male who presents with pain numbness bilateral feet  onset few years ago     Patient History / Exam:  PMH: + hemochromatosis, no renal disease. + lumbar surgery x4 12- 13 yrs. PE: reflexes absent normal strength    Impression: Study is consistent with a mild sensorimotor peripheral neuropathy. No evidence of an acute radiculopathy, entrapment neuropathy or other lower motor neuron dysfunction. Thank you. NCV & EMG Findings:  Evaluation of the left peroneal motor nerve showed prolonged distal onset latency (6.0 ms), reduced amplitude (1.7 mV), and decreased conduction velocity (B Fib-Ankle, 39 m/s). The right peroneal motor nerve showed prolonged distal onset latency (6.0 ms) and decreased conduction velocity (B Fib-Ankle, 33 m/s). The left tibial motor nerve showed prolonged distal onset latency (6.9 ms), reduced amplitude (1.7 mV), and decreased conduction velocity (Knee-Ankle, 36 m/s). The left sural sensory and the right sural sensory nerves showed prolonged distal peak latency (L4.5, R5.3 ms) and reduced amplitude (L3.1, R1.9 µV). All left vs. right side differences were within normal limits. All examined muscles (as indicated in the following table) showed no evidence of electrical instability.             Zulma Perez D.O.        Nerve Conduction Studies  Anti Sensory Summary Table     Stim Site NR Peak (ms) Norm Peak (ms) P-T Amp (µV) Norm P-T Amp Site1 Site2 Delta-P (ms) Dist (cm) [place] (m/s) Norm [place] (m/s)   Left Sural Anti Sensory (Lat Mall)   Calf    4.5 <4.2 3.1 >5.0 Calf Lat Mall 4.5 14.0 31    Right Sural Anti Sensory (Lat Mall)   Calf    5.3 <4.2 1.9 >5.0 Calf Lat Mall 5.3 14.0 26      Motor Summary Table     Stim Site NR Onset (ms) Norm Onset (ms) O-P Amp (mV) Norm O-P Amp Site1 Site2 Delta-0 (ms) Dist (cm) [place] (m/s) Norm [place] (m/s)   Left Peroneal Motor (Ext Dig Brev)   Ankle    6.0 <5.5 1.7 >2.5 B Fib Ankle 9.9 39.0 39 >40   B Fib    15.9  1.1          Right Peroneal Motor (Ext Dig Brev)   Ankle    6.0 <5.5 2.6 >2.5 B Fib Ankle 11.1 37.0 33 >40   B Fib    17.1  1.8          Left Tibial Motor (Abd Lopez Brev)   Ankle    6.9 <6.2 1.7 >3.0 Knee Ankle 12.1 43.0 36 >40   Knee    19.0  1.5            EMG     Side Muscle Nerve Root Ins Act Fibs Psw Amp Dur Poly Recrt Int [place] Schanz Comment   Left GluteusMed SupGluteal L5-S1 Nml Nml Nml Nml Nml 0 Nml Nml    Left GluteusMax InfGluteal L5-S2 Nml Nml Nml Nml Nml 0 Nml Nml    Left VastusMed Femoral L2-4 Nml Nml Nml Nml Nml 0 Nml Nml    Left AntTibialis Dp Br Fibular L4-5 Nml Nml Nml Nml Nml 0 Nml Nml    Left Peroneus Long Sup Br Fibular L5-S1 Nml Nml Nml Nml Nml 0 Nml Nml    Left Gastroc Tibial S1-2 Nml Nml Nml Nml Nml 0 Nml Nml    Left PostTibialis Tibial L5, S1 Nml Nml Nml Nml Nml 0 Nml Nml    Left ExtHallLong Dp Br Fibular L5, S1 Nml Nml Nml Nml Nml 0 Nml Nml    Left Ext Dig Brev Dp Br Fibular L5, S1 Nml Nml Nml Nml Nml 0 Nml Nml    Left Lumbo Parasp Up Rami L1-2 Nml Nml Nml         Left Lumbo Parasp Mid Rami L3-4 Nml Nml Nml         Left Lumbo Parasp Low Rami L5-S1 Nml Nml Nml         Right GluteusMed SupGluteal L5-S1 Nml Nml Nml Nml Nml 0 Nml Nml    Right VastusMed Femoral L2-4 Nml Nml Nml Nml Nml 0 Nml Nml    Right AntTibialis Dp Br Fibular L4-5 Nml Nml Nml Nml Nml 0 Nml Nml    Right Peroneus Long Sup Br Fibular L5-S1 Nml Nml Nml Nml Nml 0 Nml Nml    Right Gastroc Tibial S1-2 Nml Nml Nml Nml Nml 0 Nml Nml    Right PostTibialis Tibial L5, S1 Nml Nml Nml Nml Nml 0 Nml Nml    Right ExtHallLong Dp Br Fibular L5, S1 Nml Nml Nml Nml Nml 0 Nml Nml    Right Ext Dig Brev Dp Br Fibular L5, S1 Nml Nml Nml Nml Nml 0 Nml Nml    Right Lumbo Parasp Up Rami L1-2 Nml Nml Nml         Right Lumbo Parasp Mid Rami L3-4 Nml Nml Nml         Right Lumbo Parasp Low Rami L5-S1 Nml Nml Nml           Electronically signed by Divine Hamilton DO on 4/19/2021 at 1:05 PM

## 2021-07-13 ENCOUNTER — HOSPITAL ENCOUNTER (OUTPATIENT)
Dept: CT IMAGING | Age: 61
Discharge: HOME | End: 2021-07-13
Payer: MEDICARE

## 2021-07-13 DIAGNOSIS — R91.1 PULMONARY NODULE: ICD-10-CM

## 2021-07-13 PROCEDURE — 71250 CT THORAX DX C-: CPT

## 2021-07-14 ENCOUNTER — OFFICE VISIT (OUTPATIENT)
Dept: PULMONOLOGY | Age: 61
End: 2021-07-14
Payer: MEDICARE

## 2021-07-14 VITALS
OXYGEN SATURATION: 98 % | TEMPERATURE: 98.5 F | BODY MASS INDEX: 22.19 KG/M2 | RESPIRATION RATE: 16 BRPM | HEIGHT: 78 IN | SYSTOLIC BLOOD PRESSURE: 142 MMHG | DIASTOLIC BLOOD PRESSURE: 98 MMHG | WEIGHT: 191.8 LBS | HEART RATE: 93 BPM

## 2021-07-14 DIAGNOSIS — K76.0 HEPATIC STEATOSIS: ICD-10-CM

## 2021-07-14 DIAGNOSIS — Z87.891 FORMER SMOKER: ICD-10-CM

## 2021-07-14 DIAGNOSIS — I25.10 CORONARY ARTERY CALCIFICATION SEEN ON CT SCAN: ICD-10-CM

## 2021-07-14 DIAGNOSIS — J43.1 PANLOBULAR EMPHYSEMA (HCC): Primary | ICD-10-CM

## 2021-07-14 DIAGNOSIS — R91.1 LUNG NODULE: ICD-10-CM

## 2021-07-14 PROCEDURE — 99214 OFFICE O/P EST MOD 30 MIN: CPT | Performed by: INTERNAL MEDICINE

## 2021-07-14 NOTE — PROGRESS NOTES
Chief Complaint/Referring Provider:  Pulmonary follow up and to discuss the test results    Patient is a 68-year-old male who has come to the office for a pulmonary follow-up and to discuss the test results, patient was subjected to a CT of the chest for lung nodule, patient states that he does have some cough with mucoid expectoration which is not significant, and patient attributes to his smoking in the past, patient does not have any increasing shortness of breath or wheezing, patient does not have any significant chest pain or palpitations, patient does not have any chest wall trauma, patient does not have any significant fever chills or any abdominal symptoms, patient does not have any reflux symptoms of concern, patient's appetite is maintained, patient has gained some weight in the last few months time, patient states that he has been diagnosed having cancer of the right pinna and patient is supposed to see dermatology for surgery, patient also has peripheral neuropathy and patient is being doctored for that, patient does not have any increasing requirement for any shortness of breath, patient does not have any change in lab environment or any sick contacts, patient does not give history of any sleep fragmentation, patient does not have any other pertinent review of system of concern     Presenting HPI: Patient 68-year-old male who was referred to the office for abnormal imaging of the chest  Patient states that he was having some central chest pain which was radiating to the left shoulder and arm for which he had evaluations done including CT of the chest and for that reason patient was referred to this office for further evaluation, patient states that he does have shortness of breath for long time more so on exertion but patient attributes that to his smoking in the past, patient has been a smoker for 40 years but he quit about 5 years back, patient says along with the shortness of breath he has some cough and mucoid expectoration in the morning, patient does not have any significant rhinorrhea or nasal congestion, patient does not have any significant epistaxis or hemoptysis, patient does not have any sore throat or difficulty in swallowing, no coughing or choking while eating, patient does not have any otalgia or ear discharge, patient does not have any pleuritic chest pain, patient does have chronic neck issues, 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 and his neck issues are probably because of that, patient does not know if he had any exposures to asbestos, patient does not have any fever or chills, patient states that his appetite has gone down, patient is also has lost about 15 pounds in the last 6 months, patient also states that he has generalized body aches and more so in the knee/neck and back region for which he takes oxycodone 4 times a day, patient also has reflux symptoms for which he was taking Zantac which has been changed to Protonix because of the controversy of Zantac having co-carcinogens, patient is also supposedly going to GI tomorrow for evaluation because of the abnormal imaging, patient states that he does not have any change in the admitting were made of any sick contacts, patient states that Coreg was started recently which is the only medication which is new, patient says that he does have albuterol inhaler which he uses once in a while but not on a regular basis and patient states that it was prescribed long time back and he still has 150 doses left in the inhaler, patient does not give history of any sleep fragmentation, patient does have history of alcohol use, patient does not have any confusion lethargy, no other pertinent review of system of concern    Past Medical History:   Diagnosis Date    Hypertension     Hyperuricemia     Lumbar disc disease     Prostate cancer ([facility] [address] 2006    removal       Past Surgical History:   Procedure Laterality Date    COLONOSCOPY  2009 2019    CYST REMOVAL      scrotum    ELBOW SURGERY Right 2004    Ulnar nerve transposition    KNEE SURGERY Right 2009    Arthroscopy    PROSTATE SURGERY  2006    Dr. Eugene Anchors      x 3  in   2011       Allergies   Allergen Reactions    Asa [Aspirin]      Pt throws up       Medication list was reviewed and updated as [facility] in [facility]    Social History     Socioeconomic History    Marital status:      Spouse name: Not on file    Number of children: Not on file    Years of education: Not on file    Highest education level: Not on file   Occupational History    Not on file   Tobacco Use    Smoking status: Former Smoker     Packs/day: 1.00     Years: 40.00     Pack years: 40.00     Types: Cigarettes     Quit date: 2015     Years since quittin.2    Smokeless tobacco: Never Used   Vaping Use    Vaping Use: Never used   Substance and Sexual Activity    Alcohol use: Yes     Alcohol/week: 15.0 standard drinks     Types: 5 Cans of beer, 10 Shots of liquor per week     Comment: Drinks rum and coke daily    Drug use: No    Sexual activity: Not Currently   Other Topics Concern    Not on file   Social History Narrative    Not on file     Social Determinants of Health     Financial Resource Strain:     Difficulty of Paying Living Expenses:    Food Insecurity:     Worried About Running Out of Food in the Last Year:     Ran Out of Food in the Last Year:    Transportation Needs:     Lack of Transportation (Medical):      Lack of Transportation (Non-Medical):    Physical Activity:     Days of Exercise per Week:     Minutes of Exercise per Session:    Stress:     Feeling of Stress :    Social Connections:     Frequency of Communication with Friends and Family:     Frequency of Social Gatherings with Friends and Family:     Attends Hoahaoism Services:     Active Member of Clubs or Organizations:     Attends Club or Organization Meetings:     Marital Status:    Intimate Partner based adjustment of the mA/kV was utilized to reduce the radiation dose to as low as reasonably achievable.; CT of the abdomen was performed without and with the administration of intravenous contrast. Multiplanar reformatted images are provided for review. Dose modulation, iterative reconstruction, and/or weight based adjustment of the mA/kV was utilized to reduce the radiation dose to as low as reasonably achievable. COMPARISON: 05/19/2020 and 09/11/2012 HISTORY: ORDERING SYSTEM PROVIDED HISTORY: Essential hypertension TECHNOLOGIST PROVIDED HISTORY: Reason for exam:->mediastinal lymphadenopathy, right lung abnormal, left renal cyst Reason for Exam: essential hypertension, chest pain, Acuity: Chronic Type of Exam: Initial Additional signs and symptoms: essential hypertension, chest pain, FINDINGS: Chest: Mediastinum: Coronary artery calcifications are a marker of atherosclerosis. Scattered mediastinal lymph nodes are not pathologic by CT criteria. A small to moderate amount of ascites is present in the middle mediastinum adjacent to the distal esophagus. Lungs/pleura: The airway is patent. There is no pneumothorax or pleural effusion. Mild to moderate emphysema involves the bilateral upper lungs. There is biapical and bibasilar scarring. There is a 3 mm solid left upper lobe pulmonary nodule. Soft Tissues/Bones: Degenerative changes involve the thoracic spine. Abdomen: Organs: The spleen, pancreas, adrenal glands and kidneys are unremarkable. There is a left midpole simple renal cyst. The liver is diffusely low in attenuation consistent hepatic steatosis. GI/Bowel: There is no bowel obstruction. Peritoneum/Retroperitoneum: There is no free fluid or adenopathy. Moderate atherosclerosis involves the abdominal aorta. Bones/Soft Tissues: Degenerative changes involve the lumbar spine. Status post posterior decompression stabilization of L3-L5. 1. Hepatic steatosis.  2. Nonspecific small to moderate amount of fluid in the middle mediastinum adjacent to the distal esophagus could be due to esophagitis. 3. 3 mm solid left upper lobe pulmonary nodule. RECOMMENDATION: Fleischner Society guidelines for follow-up and management of incidentally detected pulmonary nodules: Single Solid Nodule: Nodule size less than 6 mm In a low-risk patient, no routine follow-up. In a high-risk patient, optional CT at 12 months. Ct Abdomen W Wo Contrast Additional Contrast? None    Result Date: 7/9/2020  EXAMINATION: CT OF THE CHEST WITH AND WITHOUT CONTRAST; CT ABDOMEN WITH AND WITHOUT CONTRAST 7/9/2020 7:55 am TECHNIQUE: CT of the chest was performed without and with the administration of intravenous contrast. Multiplanar reformatted images are provided for review. Dose modulation, iterative reconstruction, and/or weight based adjustment of the mA/kV was utilized to reduce the radiation dose to as low as reasonably achievable.; CT of the abdomen was performed without and with the administration of intravenous contrast. Multiplanar reformatted images are provided for review. Dose modulation, iterative reconstruction, and/or weight based adjustment of the mA/kV was utilized to reduce the radiation dose to as low as reasonably achievable. COMPARISON: 05/19/2020 and 09/11/2012 HISTORY: ORDERING SYSTEM PROVIDED HISTORY: Essential hypertension TECHNOLOGIST PROVIDED HISTORY: Reason for exam:->mediastinal lymphadenopathy, right lung abnormal, left renal cyst Reason for Exam: essential hypertension, chest pain, Acuity: Chronic Type of Exam: Initial Additional signs and symptoms: essential hypertension, chest pain, FINDINGS: Chest: Mediastinum: Coronary artery calcifications are a marker of atherosclerosis. Scattered mediastinal lymph nodes are not pathologic by CT criteria. A small to moderate amount of ascites is present in the middle mediastinum adjacent to the distal esophagus. Lungs/pleura: The airway is patent.   There is no pneumothorax or pleural effusion. Mild to moderate emphysema involves the bilateral upper lungs. There is biapical and bibasilar scarring. There is a 3 mm solid left upper lobe pulmonary nodule. Soft Tissues/Bones: Degenerative changes involve the thoracic spine. Abdomen: Organs: The spleen, pancreas, adrenal glands and kidneys are unremarkable. There is a left midpole simple renal cyst. The liver is diffusely low in attenuation consistent hepatic steatosis. GI/Bowel: There is no bowel obstruction. Peritoneum/Retroperitoneum: There is no free fluid or adenopathy. Moderate atherosclerosis involves the abdominal aorta. Bones/Soft Tissues: Degenerative changes involve the lumbar spine. Status post posterior decompression stabilization of L3-L5. 1. Hepatic steatosis. 2. Nonspecific small to moderate amount of fluid in the middle mediastinum adjacent to the distal esophagus could be due to esophagitis. 3. 3 mm solid left upper lobe pulmonary nodule. RECOMMENDATION: Fleischner Society guidelines for follow-up and management of incidentally detected pulmonary nodules: Single Solid Nodule: Nodule size less than 6 mm In a low-risk patient, no routine follow-up. In a high-risk patient, optional CT at 12 months. CT OF THE CHEST WITHOUT CONTRAST 7/13/2021 1:17 pm       TECHNIQUE:   CT of the chest was performed without the administration of intravenous   contrast. Multiplanar reformatted images are provided for review.  Dose   modulation, iterative reconstruction, and/or weight based adjustment of the   mA/kV was utilized to reduce the radiation dose to as low as reasonably   achievable.       COMPARISON:   07/09/2020       HISTORY:   ORDERING SYSTEM PROVIDED HISTORY: Pulmonary nodule   TECHNOLOGIST PROVIDED HISTORY:   Reason for exam:->see Dx   Reason for Exam: f/u lung nodule x1 yr ago; no new symptoms   Acuity: Unknown   Type of Exam: Subsequent/Follow-up   Relevant Medical/Surgical History: no hx of surg to chest; former smoker x45   yrs, quit 2 yrs ago       FINDINGS:   Mediastinum: Coronary artery calcifications are a marker of atherosclerosis. There are no enlarged thoracic lymph nodes.  No change in the right   heterogeneous thyroid gland.  No change in the small to moderate amount of   fluid within the middle mediastinum along the distal esophagus.       Lungs/pleura: The tracheobronchial tree is patent. Rubén Fail is no pneumothorax   or pleural effusion.  Mild to moderate emphysema involves the bilateral upper   lungs.  There is biapical and bibasilar scarring.       No change in the 3 mm solid left upper lobe pulmonary nodules.  No new   pulmonary nodules have developed.       Upper Abdomen: The liver is diffusely low in attenuation consistent with   hepatic steatosis.       Soft Tissues/Bones: Degenerative changes involve the thoracic spine.           Impression   1. Unchanged 2 mm solid left upper lobe pulmonary nodules. 2. Unchanged small to moderate amount of fluid in the middle mediastinum.         CT OF THE CHEST WITHOUT CONTRAST 7/13/2021 1:17 pm       TECHNIQUE:   CT of the chest was performed without the administration of intravenous   contrast. Multiplanar reformatted images are provided for review. Dose   modulation, iterative reconstruction, and/or weight based adjustment of the   mA/kV was utilized to reduce the radiation dose to as low as reasonably   achievable.       COMPARISON:   07/09/2020       HISTORY:   ORDERING SYSTEM PROVIDED HISTORY: Pulmonary nodule   TECHNOLOGIST PROVIDED HISTORY:   Reason for exam:->see Dx   Reason for Exam: f/u lung nodule x1 yr ago; no new symptoms   Acuity: Unknown   Type of Exam: Subsequent/Follow-up   Relevant Medical/Surgical History: no hx of surg to chest; former smoker x45   yrs, quit 2 yrs ago       FINDINGS:   Mediastinum: Coronary artery calcifications are a marker of atherosclerosis.    There are no enlarged thoracic lymph nodes.  No change in the right   heterogeneous thyroid CT findings do not seem to have changed  · Patient's recent cardiac stress test report was evaluated and was negative  · Patient was told about hepatic steatosis  · Patient to remain abstinent from smoking  · Patient's lung nodule is not significant at this time which needs any intervention  · Patient does not have any increasing respiratory distress or any wheezing and use of rescue inhaler is limited  · Would not be inclined to do any PFT or add any inhaler to patient's regimen at this time  · Patient to follow-up on as needed basis

## 2021-07-14 NOTE — PROGRESS NOTES
MA Communication:   The following orders are received by verbal communication from Rosario Maddox MD    Orders include:  PRN fu

## 2021-11-02 RX ORDER — CARVEDILOL 3.12 MG/1
TABLET ORAL
Qty: 180 TABLET | Refills: 0 | Status: SHIPPED | OUTPATIENT
Start: 2021-11-02 | End: 2021-12-07 | Stop reason: SDUPTHER

## 2021-12-07 ENCOUNTER — OFFICE VISIT (OUTPATIENT)
Dept: CARDIOLOGY CLINIC | Age: 61
End: 2021-12-07
Payer: MEDICARE

## 2021-12-07 VITALS
HEIGHT: 78 IN | WEIGHT: 202 LBS | SYSTOLIC BLOOD PRESSURE: 110 MMHG | HEART RATE: 94 BPM | DIASTOLIC BLOOD PRESSURE: 70 MMHG | OXYGEN SATURATION: 93 % | BODY MASS INDEX: 23.37 KG/M2

## 2021-12-07 DIAGNOSIS — I10 ESSENTIAL HYPERTENSION: ICD-10-CM

## 2021-12-07 DIAGNOSIS — I25.10 CORONARY ARTERY CALCIFICATION SEEN ON CT SCAN: Primary | ICD-10-CM

## 2021-12-07 PROCEDURE — 99213 OFFICE O/P EST LOW 20 MIN: CPT | Performed by: INTERNAL MEDICINE

## 2021-12-07 PROCEDURE — 93000 ELECTROCARDIOGRAM COMPLETE: CPT | Performed by: INTERNAL MEDICINE

## 2021-12-07 RX ORDER — CARVEDILOL 3.12 MG/1
TABLET ORAL
Qty: 180 TABLET | Refills: 1 | Status: SHIPPED | OUTPATIENT
Start: 2021-12-07 | End: 2022-08-15

## 2021-12-07 NOTE — PROGRESS NOTES
[address] [facility]  [phone]      Attending Physician: No att. providers found  Reason for Consultation/Chief Complaint: 1 year follow up,HTN CAC    Subjective   History of Present Illness:  Bianca Hernandez is a 56 y.o. patient who presents today for 1 year follow up. He has history of essential HTN, chest pain, and CAD. He last seen NP Scout Hernandez on 10/22/20 where he reported having palpitations, but denied chest pain, dyspnea, orthopnea, PND and syncope. NM Myocardial spect rest exercise or Rx testing on 3/12/18 showed normal LVF, size, and wall motion. Echo stress testing performed on 5/19/20 showed a normal finding. Today he reports that he has been doing okay since his last visit last year. He reports that he needs refills on his medications for the year. He denies chest pain, sob, dizziness, syncope or edema. Past Medical History:   has a past medical history of Hypertension, Hyperuricemia, Lumbar disc disease, and Prostate cancer ([facility] [address] Surgical History:   has a past surgical history that includes knee surgery (Right, 2009); Elbow surgery (Right, 2004); Prostate surgery (2006); Spine surgery; Colonoscopy (2009); and cyst removal.     Social History:   reports that he quit smoking about 6 years ago. His smoking use included cigarettes. He has a 40.00 pack-year smoking history. He has never used smokeless tobacco. He reports current alcohol use of about 15.0 standard drinks of alcohol per week. He reports that he does not use drugs. Family History:  family history includes Cancer in his brother, mother, and sister; Parkinsonism in his father. Home Medications:  Were reviewed and are listed in nursing record and/or below  Prior to Admission medications    Medication Sig Start Date End Date Taking?  Authorizing Provider   carvedilol (COREG) 3.125 MG tablet TAKE ONE TABLET BY MOUTH TWICE A DAY WITH MEALS 11/2/21   Nicole Hughes MD   Pregabalin (LYRICA PO) Take by mouth    Historical Provider, MD   ALBUTEROL IN Inhale into the lungs    Historical Provider, MD   folic acid (FOLVITE) 214 MCG tablet Take 400 mcg by mouth daily    Historical Provider, MD   Multiple Vitamins-Minerals (MULTIVITAMIN ADULTS PO) Take by mouth daily    Historical Provider, MD   pantoprazole (PROTONIX) 40 MG tablet  4/29/20   Historical Provider, MD   methocarbamol (ROBAXIN) 750 MG tablet TAKE ONE TABLET BY MOUTH THREE TIMES A DAY 7/28/16   Stella Borrero,    oxyCODONE-acetaminophen (PERCOCET) 5-325 MG per tablet Take 1 tablet by mouth 3 times daily as needed for Pain  Patient not taking: Reported on 7/14/2021    Historical Provider, MD   allopurinol (ZYLOPRIM) 300 MG tablet TAKE ONE TABLET BY MOUTH DAILY 5/22/16   Maurizio Mart DO   lisinopril (PRINIVIL;ZESTRIL) 20 MG tablet TAKE ONE TABLET BY MOUTH DAILY 3/8/16   Stella Borrero,    Cholecalciferol (VITAMIN D3) 2000 UNITS CAPS Take 2,000 Units by mouth daily. Historical Provider, MD        CURRENT Medications:  No current facility-administered medications for this visit. Allergies:  Asa [aspirin]     Review of Systems:   A 14 point review of symptoms completed. Pertinent positives identified in the HPI, all other review of symptoms negative as below. Objective   PHYSICAL EXAM:    There were no vitals filed for this visit.            General Appearance:  Alert, cooperative, no distress, appears stated age   Head:  Normocephalic, without obvious abnormality, atraumatic   Eyes:  PERRL, conjunctiva/corneas clear   Nose: Nares normal, no drainage or sinus tenderness   Throat: Lips, mucosa, and tongue normal   Neck: Supple, symmetrical, trachea midline, no adenopathy, thyroid: not enlarged, symmetric, no tenderness/mass/nodules, no carotid bruit or JVD   Lungs:   Clear to auscultation bilaterally, respirations unlabored   Chest Wall:  No deformity or tenderness   Heart:  Regular rate and rhythm, S1, S2 normal, no murmur, rub or gallop   Abdomen: Soft, non-tender, bowel sounds active all four quadrants,  no masses, no organomegaly   Extremities: Extremities normal, atraumatic, no cyanosis or edema   Pulses: 2+ and symmetric   Skin: Skin color, texture, turgor normal, no rashes or lesions   Pysch: Normal mood and affect   Neurologic: Normal gross motor and sensory exam.         Labs   CBC:   Lab Results   Component Value Date    WBC 3.0 2020    RBC 2.71 2020    HGB 10.8 2020    HCT 31.0 2020    .2 2020    RDW 15.6 2020    PLT 99 2020     CMP:  Lab Results   Component Value Date     2020    K 3.2 2020     2020    CO2 24 2020    BUN 6 2020    CREATININE 0.6 2020    GFRAA >60 2020    GFRAA >60 2012    AGRATIO 1.5 2020    LABGLOM >60 2020    GLUCOSE 93 2020    PROT 5.9 2020    CALCIUM 8.5 2020    BILITOT 1.9 2020    ALKPHOS 95 2020     2020    ALT 59 2020     PT/INR:  No results found for: PTINR  HgBA1c:No results found for: LABA1C  Lab Results   Component Value Date    TROPONINI <0.01 2020         Cardiac Data     Last EK20   NSR, HR 71    Echo:    Stress Test: 2020  Normal stress echo    Cath:    Studies:       I have reviewed labs and imaging/xray/diagnostic testing in this note. Assessment and Plan       Patient Active Problem List   Diagnosis    Prostate cancer ([address]    Hyperuricemia    Lumbar disc disease    Essential hypertension    Chest pain    Other hemochromatosis    Lung nodule    Panlobular emphysema ([address]    Former smoker    Alcohol use    Weight loss    Hepatic steatosis    Coronary artery calcification seen on CT scan       Plan: 1. Obtain Lipid lab work  2. Follow up with me 1 year. Scribe's attestation:   This note was scribed in the presence of Dr. Nick Moncada MD   by Zachariah Paz LPN      I, Dr Nick Moncada, personally performed the services described in this documentation, as scribed by the above signed scribe in my presence. It is both accurate and complete to my knowledge. I agree with the details independently gathered by the clinical support staff and the scribed note accurately describes my personal service to the patient. Thank you for allowing us to participate in the care of Author Crane. Please call me with any questions [phone].     Alma Alvarado MD, [facility]   Interventional Cardiologist  [facility]  [phone] [facility]  [phone] [facility]  12/7/2021 8:51 AM

## 2022-08-10 DIAGNOSIS — I25.10 CORONARY ARTERY CALCIFICATION SEEN ON CT SCAN: ICD-10-CM

## 2022-08-10 DIAGNOSIS — I10 ESSENTIAL HYPERTENSION: Primary | ICD-10-CM

## 2022-08-15 RX ORDER — CARVEDILOL 3.12 MG/1
TABLET ORAL
Qty: 180 TABLET | Refills: 2 | Status: SHIPPED | OUTPATIENT
Start: 2022-08-15

## 2022-12-01 NOTE — PROGRESS NOTES
[address] [facility]  [phone]      Attending Physician: No att. providers found  Reason for Consultation/Chief Complaint: 1 year follow up,HTN CAC    Subjective   History of Present Illness:  Rose Phan is a 59 y.o. patient who presents today for 1 year follow up. He has history of essential HTN, chest pain, and CAD. He last seen NP Nathen Shipman on 10/22/20 where he reported having palpitations, but denied chest pain, dyspnea, orthopnea, PND and syncope. NM Myocardial spect rest exercise or Rx testing on 3/12/18 showed normal LVF, size, and wall motion. Echo stress testing performed on 5/19/20 showed a normal finding. 12/7/21 he reported that he had been doing okay since his last visit last year. He reported that he needed refills on his medications for the year. He denied chest pain, sob, dizziness, syncope or edema. Today he states that from a heart standpoint he has been doing okay. He reports that he was told to stop taking lisinopril and carvedilol due to his BP running lower. He states that he has cirrhosis of the liver. He denies chest pain, dizziness syncope, but has had edema and sob. He has recently had paracentesis. Past Medical History:   has a past medical history of Hypertension, Hyperuricemia, Lumbar disc disease, and Prostate cancer ([address] Surgical History:   has a past surgical history that includes knee surgery (Right, 2009); Elbow surgery (Right, 2004); Prostate surgery (2006); Spine surgery; Colonoscopy (2009); and cyst removal.     Social History:   reports that he has been smoking cigarettes. He has a 40.00 pack-year smoking history. He has never used smokeless tobacco. He reports that he does not currently use alcohol after a past usage of about 15.0 standard drinks per week. He reports that he does not use drugs. Family History:  family history includes Cancer in his brother, mother, and sister; Parkinsonism in his father.       Home Medications:  Were reviewed and are listed in nursing record and/or below  Prior to Admission medications    Medication Sig Start Date End Date Taking? Authorizing Provider   furosemide (LASIX) 20 MG tablet Take 20 mg by mouth daily   Yes Historical Provider, MD   spironolactone (ALDACTONE) 50 MG tablet Take 50 mg by mouth daily 1/2 tab daily   Yes Historical Provider, MD   midodrine (PROAMATINE) 10 MG tablet Take 10 mg by mouth 3 times daily   Yes Historical Provider, MD   ALBUTEROL IN Inhale into the lungs   Yes Historical Provider, MD   folic acid (FOLVITE) 150 MCG tablet Take 400 mcg by mouth daily   Yes Historical Provider, MD   Multiple Vitamins-Minerals (MULTIVITAMIN ADULTS PO) Take by mouth daily   Yes Historical Provider, MD   pantoprazole (PROTONIX) 40 MG tablet  4/29/20  Yes Historical Provider, MD   methocarbamol (ROBAXIN) 750 MG tablet TAKE ONE TABLET BY MOUTH THREE TIMES A DAY 7/28/16  Yes Maurizio Mart DO   allopurinol (ZYLOPRIM) 300 MG tablet TAKE ONE TABLET BY MOUTH DAILY 5/22/16  Yes Maurizio Mart DO   Cholecalciferol (VITAMIN D3) 2000 UNITS CAPS Take 2,000 Units by mouth daily. Yes Historical Provider, MD   carvedilol (COREG) 3.125 MG tablet TAKE ONE TABLET BY MOUTH TWICE A DAY WITH MEALS 8/15/22   Zhen Russo MD   Pregabalin (LYRICA PO) Take by mouth    Historical Provider, MD   oxyCODONE-acetaminophen (PERCOCET) 5-325 MG per tablet Take 1 tablet by mouth 3 times daily as needed for Pain  Patient not taking: No sig reported    Historical Provider, MD   lisinopril (PRINIVIL;ZESTRIL) 20 MG tablet TAKE ONE TABLET BY MOUTH DAILY 3/8/16   Xavier Montero DO        CURRENT Medications:  No current facility-administered medications for this visit. Allergies:  Asa [aspirin]     Review of Systems:   A 14 point review of symptoms completed. Pertinent positives identified in the HPI, all other review of symptoms negative as below.       Objective   PHYSICAL EXAM:    Vitals:    12/02/22 1041   BP: (!) 94/56   Pulse:    SpO2:     Weight: 170 lb (77.1 kg)         General Appearance:  Alert, cooperative, no distress, appears stated age   Head:  Normocephalic, without obvious abnormality, atraumatic   Eyes:  PERRL, conjunctiva/corneas clear   Nose: Nares normal, no drainage or sinus tenderness   Throat: Lips, mucosa, and tongue normal   Neck: Supple, symmetrical, trachea midline, no adenopathy, thyroid: not enlarged, symmetric, no tenderness/mass/nodules, no carotid bruit or JVD   Lungs:   Clear to auscultation bilaterally, respirations unlabored   Chest Wall:  No deformity or tenderness   Heart:  Regular rate and rhythm, S1, S2 normal, no murmur, rub or gallop   Abdomen:   Soft, non-tender, bowel sounds active all four quadrants,  no masses, no organomegaly   Extremities: Extremities tc le edema    Pulses: 2+ and symmetric   Skin: Skin color, texture, turgor normal, no rashes or lesions   Pysch: Normal mood and affect   Neurologic: Normal gross motor and sensory exam.         Labs   CBC:   Lab Results   Component Value Date/Time    WBC 3.0 05/20/2020 03:17 AM    RBC 2.71 05/20/2020 03:17 AM    HGB 10.8 05/20/2020 03:17 AM    HCT 31.0 05/20/2020 03:17 AM    .2 05/20/2020 03:17 AM    RDW 15.6 05/20/2020 03:17 AM    PLT 99 05/20/2020 03:17 AM     CMP:  Lab Results   Component Value Date/Time     05/20/2020 03:17 AM    K 3.2 05/20/2020 03:17 AM     05/20/2020 03:17 AM    CO2 24 05/20/2020 03:17 AM    BUN 6 05/20/2020 03:17 AM    CREATININE 0.6 08/03/2020 03:45 PM    GFRAA >60 08/03/2020 03:45 PM    GFRAA >60 06/08/2012 12:00 PM    AGRATIO 1.5 05/19/2020 07:53 PM    LABGLOM >60 08/03/2020 03:45 PM    GLUCOSE 93 05/20/2020 03:17 AM    PROT 5.9 05/20/2020 03:17 AM    CALCIUM 8.5 05/20/2020 03:17 AM    BILITOT 1.9 05/20/2020 03:17 AM    ALKPHOS 95 05/20/2020 03:17 AM     05/20/2020 03:17 AM    ALT 59 05/20/2020 03:17 AM     PT/INR:  No results found for: PTINR  HgBA1c:No results found for: LABA1C  Lab Results   Component Value Date    TROPONINI <0.01 2020         Cardiac Data     Last EK20   NSR, HR 71    Echo:    Stress Test: 3/12/18   Summary  Normal myocardial perfusion study with normal left ventricular function,  size, and wall motion. The estimated left ventricular function is 64%. Stress Test: 2020  Normal stress echo    Cath:    Studies: Cardiac MRI [facility] 20  Impression    IMPRESSION:     There is no evidence of myocardial iron overload. The left ventricle is normal in size with preserved systolic function. The cardiac index is elevated suggestive of a hypermetabolic state. The right ventricle is normal in size with preserved systolic function. The aortic root is dilated. The descending aorta is dilated. There is partial fusion of the right and non coronary cusps with restricted motion. The aortic valve area is 2.1 cm2 by planimetry. Given multiple pulmonary abnormalities, consider contrasted chest CT for further evaluation. Vasci-oly reflux duplex lilibeth 10/24/22    FINAL IMPRESSIONS   Conclusions: There is no evidence of venous thrombosis in the femoropopliteal segment or   saphenous veins in the bilateral lower extremities. There is evidence of deep venous reflux in the right common femoral vein, consistent with   venous valvular incompetence and venous insufficiency. There is evidence of deep venous reflux in the left common femoral vein, deep femoral vein, and    femoral vein, consistent with venous valvular incompetence and venous insufficiency. There is no other evidence of significant venous valvular incompetence in the deep, superficial    or perforating veins of the bilateral lower extremities. CTAbdomen and Pelvis 11/3/22  IMPRESSION:     1. Cirrhotic morphology of the liver. 2. Large volume ascites. There is questionable nodularity peritoneal surfaces. Peritoneal carcinomatosis should be excluded.  Recommend paracentesis with cytologic analysis of the fluid. I have reviewed labs and imaging/xray/diagnostic testing in this note. Assessment and Plan     1. Coronary artery calcification seen on CT scan    2. Essential hypertension    3. Other chest pain          Plan:  ECHO testing to assess for heart size and function, hypotension, sob. Call [phone] to schedule the echo-will call with results  Follow up in 1 year or sooner      Scribe's attestation: This note was scribed in the presence of Dr. Nuria Ferrera MD   by Alexandro Harris LPN      I, Dr Nuria Ferrera, personally performed the services described in this documentation, as scribed by the above signed scribe in my presence. It is both accurate and complete to my knowledge. I agree with the details independently gathered by the clinical support staff and the scribed note accurately describes my personal service to the patient. Thank you for allowing us to participate in the care of Paulina Solomon. Please call me with any questions [phone].     Nuria Ferrera MD, [facility] [place]   Interventional Cardiologist  [address]  [phone] [facility]  [phone] [address] [place] [address]  12/2/2022 10:58 AM

## 2022-12-02 ENCOUNTER — OFFICE VISIT (OUTPATIENT)
Dept: CARDIOLOGY CLINIC | Age: 62
End: 2022-12-02
Payer: MEDICARE

## 2022-12-02 VITALS
HEART RATE: 55 BPM | HEIGHT: 78 IN | OXYGEN SATURATION: 99 % | WEIGHT: 170 LBS | BODY MASS INDEX: 19.67 KG/M2 | DIASTOLIC BLOOD PRESSURE: 56 MMHG | SYSTOLIC BLOOD PRESSURE: 94 MMHG

## 2022-12-02 DIAGNOSIS — I25.10 CORONARY ARTERY CALCIFICATION SEEN ON CT SCAN: Primary | ICD-10-CM

## 2022-12-02 DIAGNOSIS — I10 ESSENTIAL HYPERTENSION: ICD-10-CM

## 2022-12-02 DIAGNOSIS — R07.89 OTHER CHEST PAIN: ICD-10-CM

## 2022-12-02 PROCEDURE — 3078F DIAST BP <80 MM HG: CPT | Performed by: INTERNAL MEDICINE

## 2022-12-02 PROCEDURE — 99214 OFFICE O/P EST MOD 30 MIN: CPT | Performed by: INTERNAL MEDICINE

## 2022-12-02 PROCEDURE — 3074F SYST BP LT 130 MM HG: CPT | Performed by: INTERNAL MEDICINE

## 2022-12-02 RX ORDER — SPIRONOLACTONE 50 MG/1
50 TABLET, FILM COATED ORAL DAILY
COMMUNITY

## 2022-12-02 RX ORDER — MIDODRINE HYDROCHLORIDE 10 MG/1
10 TABLET ORAL 3 TIMES DAILY
COMMUNITY

## 2022-12-02 RX ORDER — FUROSEMIDE 20 MG/1
20 TABLET ORAL DAILY
COMMUNITY

## 2022-12-02 NOTE — LETTER
Rose Phan  1960      [address] [facility]  [phone]      Attending Physician: No att. providers found  Reason for Consultation/Chief Complaint: 1 year follow up,HTN CAC    Subjective   History of Present Illness:  Rose Phan is a 59 y.o. patient who presents today for 1 year follow up. He has history of essential HTN, chest pain, and CAD. He last seen NP Jayna Montague on 10/22/20 where he reported having palpitations, but denied chest pain, dyspnea, orthopnea, PND and syncope. NM Myocardial spect rest exercise or Rx testing on 3/12/18 showed normal LVF, size, and wall motion. Echo stress testing performed on 5/19/20 showed a normal finding. 12/7/21 he reported that he had been doing okay since his last visit last year. He reported that he needed refills on his medications for the year. He denied chest pain, sob, dizziness, syncope or edema. Today he states that from a heart standpoint he has been doing okay. He reports that he was told to stop taking lisinopril and carvedilol due to his BP running lower. He states that he has cirrhosis of the liver. He denies chest pain, dizziness syncope, but has had edema and sob. He has recently had paracentesis. Past Medical History:   has a past medical history of Hypertension, Hyperuricemia, Lumbar disc disease, and Prostate cancer ([facility] [address] Surgical History:   has a past surgical history that includes knee surgery (Right, 2009); Elbow surgery (Right, 2004); Prostate surgery (2006); Spine surgery; Colonoscopy (2009); and cyst removal.     Social History:   reports that he has been smoking cigarettes. He has a 40.00 pack-year smoking history. He has never used smokeless tobacco. He reports that he does not currently use alcohol after a past usage of about 15.0 standard drinks per week. He reports that he does not use drugs.      Family History:  family history includes Cancer in his brother, mother, and sister; Parkinsonism in his father. Home Medications:  Were reviewed and are listed in nursing record and/or below  Prior to Admission medications    Medication Sig Start Date End Date Taking? Authorizing Provider   furosemide (LASIX) 20 MG tablet Take 20 mg by mouth daily   Yes Historical Provider, MD   spironolactone (ALDACTONE) 50 MG tablet Take 50 mg by mouth daily 1/2 tab daily   Yes Historical Provider, MD   midodrine (PROAMATINE) 10 MG tablet Take 10 mg by mouth 3 times daily   Yes Historical Provider, MD   ALBUTEROL IN Inhale into the lungs   Yes Historical Provider, MD   folic acid (FOLVITE) 587 MCG tablet Take 400 mcg by mouth daily   Yes Historical Provider, MD   Multiple Vitamins-Minerals (MULTIVITAMIN ADULTS PO) Take by mouth daily   Yes Historical Provider, MD   pantoprazole (PROTONIX) 40 MG tablet  4/29/20  Yes Historical Provider, MD   methocarbamol (ROBAXIN) 750 MG tablet TAKE ONE TABLET BY MOUTH THREE TIMES A DAY 7/28/16  Yes Maurizio Mart DO   allopurinol (ZYLOPRIM) 300 MG tablet TAKE ONE TABLET BY MOUTH DAILY 5/22/16  Yes Maurizio Mart DO   Cholecalciferol (VITAMIN D3) 2000 UNITS CAPS Take 2,000 Units by mouth daily. Yes Historical Provider, MD   carvedilol (COREG) 3.125 MG tablet TAKE ONE TABLET BY MOUTH TWICE A DAY WITH MEALS 8/15/22   Jennie Harrison MD   Pregabalin (LYRICA PO) Take by mouth    Historical Provider, MD   oxyCODONE-acetaminophen (PERCOCET) 5-325 MG per tablet Take 1 tablet by mouth 3 times daily as needed for Pain  Patient not taking: No sig reported    Historical Provider, MD   lisinopril (PRINIVIL;ZESTRIL) 20 MG tablet TAKE ONE TABLET BY MOUTH DAILY 3/8/16   Radhika Kat DO        CURRENT Medications:  No current facility-administered medications for this visit. Allergies:  Asa [aspirin]     Review of Systems:   A 14 point review of symptoms completed. Pertinent positives identified in the HPI, all other review of symptoms negative as below.       Objective   PHYSICAL EXAM:    Vitals: 12/02/22 1041   BP: (!) 94/56   Pulse:    SpO2:     Weight: 170 lb (77.1 kg)         General Appearance:  Alert, cooperative, no distress, appears stated age   Head:  Normocephalic, without obvious abnormality, atraumatic   Eyes:  PERRL, conjunctiva/corneas clear   Nose: Nares normal, no drainage or sinus tenderness   Throat: Lips, mucosa, and tongue normal   Neck: Supple, symmetrical, trachea midline, no adenopathy, thyroid: not enlarged, symmetric, no tenderness/mass/nodules, no carotid bruit or JVD   Lungs:   Clear to auscultation bilaterally, respirations unlabored   Chest Wall:  No deformity or tenderness   Heart:  Regular rate and rhythm, S1, S2 normal, no murmur, rub or gallop   Abdomen:   Soft, non-tender, bowel sounds active all four quadrants,  no masses, no organomegaly   Extremities: Extremities tc le edema    Pulses: 2+ and symmetric   Skin: Skin color, texture, turgor normal, no rashes or lesions   Pysch: Normal mood and affect   Neurologic: Normal gross motor and sensory exam.         Labs   CBC:   Lab Results   Component Value Date/Time    WBC 3.0 05/20/2020 03:17 AM    RBC 2.71 05/20/2020 03:17 AM    HGB 10.8 05/20/2020 03:17 AM    HCT 31.0 05/20/2020 03:17 AM    .2 05/20/2020 03:17 AM    RDW 15.6 05/20/2020 03:17 AM    PLT 99 05/20/2020 03:17 AM     CMP:  Lab Results   Component Value Date/Time     05/20/2020 03:17 AM    K 3.2 05/20/2020 03:17 AM     05/20/2020 03:17 AM    CO2 24 05/20/2020 03:17 AM    BUN 6 05/20/2020 03:17 AM    CREATININE 0.6 08/03/2020 03:45 PM    GFRAA >60 08/03/2020 03:45 PM    GFRAA >60 06/08/2012 12:00 PM    AGRATIO 1.5 05/19/2020 07:53 PM    LABGLOM >60 08/03/2020 03:45 PM    GLUCOSE 93 05/20/2020 03:17 AM    PROT 5.9 05/20/2020 03:17 AM    CALCIUM 8.5 05/20/2020 03:17 AM    BILITOT 1.9 05/20/2020 03:17 AM    ALKPHOS 95 05/20/2020 03:17 AM     05/20/2020 03:17 AM    ALT 59 05/20/2020 03:17 AM     PT/INR:  No results found for: PTINR  HgBA1c:No results found for: LABA1C  Lab Results   Component Value Date    TROPONINI <0.01 2020         Cardiac Data     Last EK20   NSR, HR 71    Echo:    Stress Test: 3/12/18   Summary  Normal myocardial perfusion study with normal left ventricular function,  size, and wall motion. The estimated left ventricular function is 64%. Stress Test: 2020  Normal stress echo    Cath:    Studies: Cardiac MRI [facility] 20  Impression    IMPRESSION:     There is no evidence of myocardial iron overload. The left ventricle is normal in size with preserved systolic function. The cardiac index is elevated suggestive of a hypermetabolic state. The right ventricle is normal in size with preserved systolic function. The aortic root is dilated. The descending aorta is dilated. There is partial fusion of the right and non coronary cusps with restricted motion. The aortic valve area is 2.1 cm2 by planimetry. Given multiple pulmonary abnormalities, consider contrasted chest CT for further evaluation. Vasci-oly reflux duplex lilibeth 10/24/22    FINAL IMPRESSIONS   Conclusions: There is no evidence of venous thrombosis in the femoropopliteal segment or   saphenous veins in the bilateral lower extremities. There is evidence of deep venous reflux in the right common femoral vein, consistent with   venous valvular incompetence and venous insufficiency. There is evidence of deep venous reflux in the left common femoral vein, deep femoral vein, and    femoral vein, consistent with venous valvular incompetence and venous insufficiency. There is no other evidence of significant venous valvular incompetence in the deep, superficial    or perforating veins of the bilateral lower extremities. CTAbdomen and Pelvis 11/3/22  IMPRESSION:     1. Cirrhotic morphology of the liver. 2. Large volume ascites. There is questionable nodularity peritoneal surfaces. Peritoneal carcinomatosis should be excluded.  Recommend paracentesis with cytologic analysis of the fluid. I have reviewed labs and imaging/xray/diagnostic testing in this note. Assessment and Plan     1. Coronary artery calcification seen on CT scan    2. Essential hypertension    3. Other chest pain          Plan:  1. ECHO testing to assess for heart size and function, hypotension, sob. 2. Call [phone] to schedule the echo-will call with results  3. Follow up in 1 year or sooner      Scribe's attestation: This note was scribed in the presence of Dr. Yashira Webber MD   by Ja Nguyen LPN      I, Dr Yashira Webber, personally performed the services described in this documentation, as scribed by the above signed scribe in my presence. It is both accurate and complete to my knowledge. I agree with the details independently gathered by the clinical support staff and the scribed note accurately describes my personal service to the patient. Thank you for allowing us to participate in the care of Padmini Conn. Please call me with any questions [phone].     Yashira Webber MD, [facility] [place]   Interventional Cardiologist  [facility]  [phone] [facility]  [phone] [address] [place] [address]  12/2/2022 10:58 AM

## 2022-12-02 NOTE — PATIENT INSTRUCTIONS
Plan:  ECHO testing to assess for heart sized and function  Call [phone] to schedule the echo-will call with results  Follow up in 1 year or sooner

## 2023-01-12 ENCOUNTER — HOSPITAL ENCOUNTER (OUTPATIENT)
Dept: CARDIOLOGY | Age: 63
Discharge: HOME | End: 2023-01-12
Payer: MEDICARE

## 2023-01-12 ENCOUNTER — TELEPHONE (OUTPATIENT)
Dept: CARDIOLOGY CLINIC | Age: 63
End: 2023-01-12

## 2023-01-12 DIAGNOSIS — R07.89 OTHER CHEST PAIN: ICD-10-CM

## 2023-01-12 DIAGNOSIS — Q23.1 BICUSPID AORTIC VALVE DETERMINED BY IMAGING: Primary | ICD-10-CM

## 2023-01-12 LAB
LV EF: 55 %
LVEF MODALITY: NORMAL

## 2023-01-12 PROCEDURE — 93306 TTE W/DOPPLER COMPLETE: CPT

## 2023-01-12 NOTE — TELEPHONE ENCOUNTER
Called and spoke with patient. Relayed SRJ results and instructions. He VU.  Order placed for cardiac MRI, pt ABELARDO to call central scheduling to schedule testing at [facility]..

## 2023-01-12 NOTE — TELEPHONE ENCOUNTER
----- Message from Satish Lovelace MD sent at 1/12/2023  3:45 PM EST -----  Let patient know echo test shows normal heart function, however shows slight wear and tear on ht valves, rec: cardiac MRI to further assess. Thanks.

## 2023-02-09 ENCOUNTER — TELEPHONE (OUTPATIENT)
Dept: CARDIOLOGY CLINIC | Age: 63
End: 2023-02-09

## 2023-02-09 NOTE — LETTER
[address] [place] [address]  Phone: [phone]  Fax: [phone]    Tomas Sofia MD        February 9, 2023     Patient: Freddie Greenfield   YOB: 1960   Date of Visit: 2/9/2023       To Whom It May Concern: It is my medical opinion that Josette Quinonez is at acceptable (intermediate) cv risk for planned procedure/surgery. If you have any questions or concerns, please don't hesitate to call.     Sincerely,    Tomas Sofia MD

## 2023-02-09 NOTE — TELEPHONE ENCOUNTER
Cardiac clearance request from [id number]: 02/22/2023  Procedure: Open Recurrent Umbilical Hernia Repair    Anesthesia: General    Requesting to hold: n/a    LOV 12/02/2022        Fax to [phone]

## 2023-02-15 ENCOUNTER — HOSPITAL ENCOUNTER (OUTPATIENT)
Dept: MRI IMAGING | Age: 63
Discharge: HOME | End: 2023-02-15
Payer: MEDICARE

## 2023-02-15 DIAGNOSIS — Q23.1 BICUSPID AORTIC VALVE DETERMINED BY IMAGING: ICD-10-CM

## 2023-02-15 PROCEDURE — 75561 CARDIAC MRI FOR MORPH W/DYE: CPT

## 2023-02-15 PROCEDURE — A9585 GADOBUTROL INJECTION: HCPCS | Performed by: INTERNAL MEDICINE

## 2023-02-15 PROCEDURE — 6360000004 HC RX CONTRAST MEDICATION: Performed by: INTERNAL MEDICINE

## 2023-02-15 RX ADMIN — GADOBUTROL 14 ML: 604.72 INJECTION INTRAVENOUS at 09:41

## 2023-02-16 ENCOUNTER — TELEPHONE (OUTPATIENT)
Dept: CARDIOLOGY CLINIC | Age: 63
End: 2023-02-16

## 2023-02-16 LAB
LV EF: 67 %
LVEF MODALITY: NORMAL

## 2023-02-16 NOTE — TELEPHONE ENCOUNTER
Created telephone encounter. [facility] requesting a call back to the office.    Faxed report to PCP per [facility] request.

## 2023-02-16 NOTE — TELEPHONE ENCOUNTER
Talked with patient and relayed results of his MRI. Pt does have a GI doctor and is aware of the ascites and hiatal hernia.  VU

## 2023-02-16 NOTE — TELEPHONE ENCOUNTER
----- Message from Charmayne Canary, MD sent at 2/16/2023  8:59 AM EST -----  Let patient know their cardiac MRI test shows normal heart function, moderate bicuspid AV disease, can be f/u w/ echo in one year and lets order that. Let pt know incidentally, there is hiatal hernia and ascites and pt should f/u w/ pcp for that and should be referred to GI as well, lets copy report to PCP and refer to GI if pt interested in that. Thanks.

## 2023-12-06 NOTE — PROGRESS NOTES
[address] [place] [address]  [phone]      Attending Physician: No att. providers found  Reason for Consultation/Chief Complaint: 1 year follow up, HTN     Subjective   History of Present Illness:  Corrina Duval is a 57 y.o. patient who presents today for 1 year follow up. He has history of essential HTN, chest pain, and CAD. He last seen NP Prudence Flowers on 10/22/20 where he reported having palpitations, but denied chest pain, dyspnea, orthopnea, PND and syncope. NM Myocardial spect rest exercise or Rx testing on 3/12/18 showed normal LVF, size, and wall motion. Echo stress testing performed on 5/19/20 showed a normal finding. 12/7/21 he reported that he had been doing okay since his last visit last year. He reported that he needed refills on his medications for the year. He denied chest pain, sob, dizziness, syncope or edema. OV 12/2/2022 He reported from standpoint he had been doing okay. He stated he was told to stop lisinopril and carvedilol due to BP running lower. He reported cirrhosis of the liver. Today***    Past Medical History:   has a past medical history of Hypertension, Hyperuricemia, Lumbar disc disease, and Prostate cancer ([address]). Surgical History:   has a past surgical history that includes knee surgery (Right, 2009); Elbow surgery (Right, 2004); Prostate surgery (2006); Spine surgery; Colonoscopy (2009); and cyst removal.     Social History:   reports that he has been smoking cigarettes. He has a 40.00 pack-year smoking history. He has never used smokeless tobacco. He reports that he does not currently use alcohol after a past usage of about 15.0 standard drinks of alcohol per week. He reports that he does not use drugs. Family History:  family history includes Cancer in his brother, mother, and sister; Parkinsonism in his father.       Home Medications:  Were reviewed and are listed in nursing record and/or below  Prior to Admission medications
evidence of venous thrombosis in the femoropopliteal segment or   saphenous veins in the bilateral lower extremities. There is evidence of deep venous reflux in the right common femoral vein, consistent with   venous valvular incompetence and venous insufficiency. There is evidence of deep venous reflux in the left common femoral vein, deep femoral vein, and    femoral vein, consistent with venous valvular incompetence and venous insufficiency. There is no other evidence of significant venous valvular incompetence in the deep, superficial    or perforating veins of the bilateral lower extremities. CTAbdomen and Pelvis 11/3/22  IMPRESSION:     1. Cirrhotic morphology of the liver. 2. Large volume ascites. There is questionable nodularity peritoneal surfaces. Peritoneal carcinomatosis should be excluded. Recommend paracentesis with cytologic analysis of the fluid. Cardiac MRI 2/16/2023  IMPRESSION:  1. Bicuspid aortic valve with partial fusion of the right and noncoronary cusps with mild restricted motion. Aortic valve area is measured at greater than 3 sq cm. Peak systolic velocity is in the normal range at 1.7 m/s. 2. No delayed myocardial enhancement with normal left ventricular wall motion and ejection fraction of 66%. 3. Trace right pleural effusion. 4. Moderate ascites corresponding to recent CT findings. 5. Hiatal hernia with trapped ascites in the lower mediastinum. I have reviewed labs and imaging/xray/diagnostic testing in this note. Assessment and Plan     1. Essential hypertension    2. Coronary artery calcification seen on CT scan    3. Other chest pain           Plan       Patient Plan:  Okay to remain off of carvedilol and lisinopril in correlation to hypotension  Okay to remain off of spirolactone in correlation to rash  Continue to follow with GI for liver disease  EKG reviewed  Follow up in 1 year. Scribe's attestation:   This note was scribed in the presence of

## 2023-12-07 ENCOUNTER — OFFICE VISIT (OUTPATIENT)
Dept: CARDIOLOGY CLINIC | Age: 63
End: 2023-12-07
Payer: MEDICARE

## 2023-12-07 VITALS
WEIGHT: 186 LBS | SYSTOLIC BLOOD PRESSURE: 124 MMHG | DIASTOLIC BLOOD PRESSURE: 78 MMHG | HEART RATE: 115 BPM | HEIGHT: 78 IN | OXYGEN SATURATION: 97 % | BODY MASS INDEX: 21.52 KG/M2

## 2023-12-07 DIAGNOSIS — I10 ESSENTIAL HYPERTENSION: Primary | ICD-10-CM

## 2023-12-07 DIAGNOSIS — I25.10 CORONARY ARTERY CALCIFICATION SEEN ON CT SCAN: ICD-10-CM

## 2023-12-07 DIAGNOSIS — R07.89 OTHER CHEST PAIN: ICD-10-CM

## 2023-12-07 PROCEDURE — 3074F SYST BP LT 130 MM HG: CPT | Performed by: INTERNAL MEDICINE

## 2023-12-07 PROCEDURE — 93000 ELECTROCARDIOGRAM COMPLETE: CPT | Performed by: INTERNAL MEDICINE

## 2023-12-07 PROCEDURE — 3078F DIAST BP <80 MM HG: CPT | Performed by: INTERNAL MEDICINE

## 2023-12-07 PROCEDURE — 99214 OFFICE O/P EST MOD 30 MIN: CPT | Performed by: INTERNAL MEDICINE

## 2023-12-07 NOTE — PATIENT INSTRUCTIONS
Patient Plan:  Angelo Gan to remain off of carvedilol and lisinopril in correlation to hypotension  Okay to remain off of spirolactone in correlation to rash  Continue to follow with oncology for liver disease  EKG reviewed  Follow up in 1 year.

## 2024-12-06 NOTE — PROGRESS NOTES
[facility]   CONSULTATION  [phone]      Attending Physician: No att. providers found  Reason for Consultation/Chief Complaint: 1 year follow up, HTN     Subjective   History of Present Illness:  Maurizio Martinez is a 63 y.o. patient who presents today for 1 year follow up. He has history of essential HTN, chest pain, and CAD. He last seen NP Bárbara Rolle on 10/22/20 where he reported having palpitations, but denied chest pain, dyspnea, orthopnea, PND and syncope. NM Myocardial spect rest exercise or Rx testing on 3/12/18 showed normal LVF, size, and wall motion. Echo stress testing performed on 5/19/20 showed a normal finding.   12/7/21 he reported that he had been doing okay since his last visit last year. He reported that he needed refills on his medications for the year. He denied chest pain, sob, dizziness, syncope or edema.   OV 12/2/2022 He reported from standpoint he had been doing okay. He stated he was told to stop lisinopril and carvedilol due to BP running lower. He reported cirrhosis of the liver.               LOV 12/7/2023 he reported pain that was associated with hernia. Was not taking Coreg, spironolactone, and lisinopril due to low blood pressure, felt better after stopping medications.              Today he reports to office with a cane for ambulation. He states he has been dealing with hernia. He reports taking medications as prescribed and tolerates well. Denies Shortness of breath, chest pain, palpitations, dizziness, syncope and edema.           Past Medical History:   has a past medical history of Hypertension, Hyperuricemia, Lumbar disc disease, and Prostate cancer (HCC).    Surgical History:   has a past surgical history that includes knee surgery (Right, 2009); Elbow surgery (Right, 2004); Prostate surgery (2006); Spine surgery; Colonoscopy (2009); and cyst removal.     Social History:   reports that he quit smoking about 9 years ago. His smoking use included

## 2024-12-10 ENCOUNTER — OFFICE VISIT (OUTPATIENT)
Dept: CARDIOLOGY CLINIC | Age: 64
End: 2024-12-10
Payer: MEDICARE

## 2024-12-10 VITALS
HEART RATE: 98 BPM | HEIGHT: 78 IN | OXYGEN SATURATION: 99 % | WEIGHT: 177.5 LBS | SYSTOLIC BLOOD PRESSURE: 112 MMHG | BODY MASS INDEX: 20.54 KG/M2 | DIASTOLIC BLOOD PRESSURE: 60 MMHG

## 2024-12-10 DIAGNOSIS — I10 ESSENTIAL HYPERTENSION: ICD-10-CM

## 2024-12-10 DIAGNOSIS — Z79.899 MEDICATION MANAGEMENT: ICD-10-CM

## 2024-12-10 DIAGNOSIS — R07.89 OTHER CHEST PAIN: ICD-10-CM

## 2024-12-10 DIAGNOSIS — I25.10 CORONARY ARTERY CALCIFICATION SEEN ON CT SCAN: Primary | ICD-10-CM

## 2024-12-10 PROCEDURE — 3078F DIAST BP <80 MM HG: CPT | Performed by: INTERNAL MEDICINE

## 2024-12-10 PROCEDURE — 99214 OFFICE O/P EST MOD 30 MIN: CPT | Performed by: INTERNAL MEDICINE

## 2024-12-10 PROCEDURE — 1123F ACP DISCUSS/DSCN MKR DOCD: CPT | Performed by: INTERNAL MEDICINE

## 2024-12-10 PROCEDURE — 93000 ELECTROCARDIOGRAM COMPLETE: CPT | Performed by: INTERNAL MEDICINE

## 2024-12-10 PROCEDURE — 3074F SYST BP LT 130 MM HG: CPT | Performed by: INTERNAL MEDICINE

## 2024-12-10 RX ORDER — AMLODIPINE BESYLATE 10 MG/1
10 TABLET ORAL DAILY
COMMUNITY
Start: 2024-09-30

## 2024-12-10 RX ORDER — POTASSIUM CHLORIDE 750 MG/1
10 TABLET, EXTENDED RELEASE ORAL DAILY
COMMUNITY
Start: 2024-09-26

## 2024-12-10 NOTE — PATIENT INSTRUCTIONS
Continue current cardiac medications: Amlodipine 10 mg  Medications reviewed. Medications refilled as warranted.   EKG done today- review.  Blood work: Fasting Lipid- fast for 8 hours prior to blood work.  Echocardiogram to assess for heart size and function to assess aortic valve.   Follow up with me in 1 year or sooner if needed.       Your provider has ordered testing for further evaluation.  An order/prescription has been included in your paper work.   To schedule outpatient testing, contact Central Scheduling by calling [address] [place], [place] [address] ([phone]).

## 2024-12-31 ENCOUNTER — HOSPITAL ENCOUNTER (OUTPATIENT)
Dept: CARDIOLOGY | Age: 64
Discharge: HOME | End: 2025-01-02
Attending: INTERNAL MEDICINE
Payer: MEDICARE

## 2024-12-31 VITALS
SYSTOLIC BLOOD PRESSURE: 124 MMHG | BODY MASS INDEX: 20.83 KG/M2 | HEIGHT: 78 IN | DIASTOLIC BLOOD PRESSURE: 78 MMHG | WEIGHT: 180 LBS

## 2024-12-31 DIAGNOSIS — I25.10 CORONARY ARTERY CALCIFICATION SEEN ON CT SCAN: ICD-10-CM

## 2024-12-31 LAB
ECHO AO ASC DIAM: 3.6 CM
ECHO AO ASCENDING AORTA INDEX: 1.67 CM/M2
ECHO AO ROOT DIAM: 3.8 CM
ECHO AO ROOT INDEX: 1.76 CM/M2
ECHO AV AREA PEAK VELOCITY: 1.5 CM2
ECHO AV AREA VTI: 1.7 CM2
ECHO AV AREA/BSA PEAK VELOCITY: 0.7 CM2/M2
ECHO AV AREA/BSA VTI: 0.8 CM2/M2
ECHO AV CUSP MM: 1.6 CM
ECHO AV MEAN GRADIENT: 10 MMHG
ECHO AV MEAN VELOCITY: 1.5 M/S
ECHO AV PEAK GRADIENT: 19 MMHG
ECHO AV PEAK VELOCITY: 2.2 M/S
ECHO AV VELOCITY RATIO: 0.41
ECHO AV VTI: 48.3 CM
ECHO BSA: 2.12 M2
ECHO EST RA PRESSURE: 3 MMHG
ECHO LA AREA 2C: 13.5 CM2
ECHO LA AREA 4C: 13.9 CM2
ECHO LA DIAMETER INDEX: 1.39 CM/M2
ECHO LA DIAMETER: 3 CM
ECHO LA MAJOR AXIS: 4.5 CM
ECHO LA MINOR AXIS: 4.3 CM
ECHO LA TO AORTIC ROOT RATIO: 0.79
ECHO LA VOL BP: 33 ML (ref 18–58)
ECHO LA VOL MOD A2C: 34 ML (ref 18–58)
ECHO LA VOL MOD A4C: 31 ML (ref 18–58)
ECHO LA VOL/BSA BIPLANE: 15 ML/M2 (ref 16–34)
ECHO LA VOLUME INDEX MOD A2C: 16 ML/M2 (ref 16–34)
ECHO LA VOLUME INDEX MOD A4C: 14 ML/M2 (ref 16–34)
ECHO LV E' LATERAL VELOCITY: 9.14 CM/S
ECHO LV E' SEPTAL VELOCITY: 10 CM/S
ECHO LV EDV 3D: 140 ML
ECHO LV EDV INDEX 3D: 65 ML/M2
ECHO LV EF PHYSICIAN: 55 %
ECHO LV EJECTION FRACTION 3D: 55 %
ECHO LV ESV 3D: 63 ML
ECHO LV ESV INDEX 3D: 29 ML/M2
ECHO LV FRACTIONAL SHORTENING: 31 % (ref 28–44)
ECHO LV GLOBAL LONGITUDINAL STRAIN (GLS): -14 %
ECHO LV GLOBAL LONGITUDINAL STRAIN (GLS): -20.3 %
ECHO LV GLOBAL LONGITUDINAL STRAIN (GLS): -22.1 %
ECHO LV GLOBAL LONGITUDINAL STRAIN (GLS): -32 %
ECHO LV INTERNAL DIMENSION DIASTOLE INDEX: 2.55 CM/M2
ECHO LV INTERNAL DIMENSION DIASTOLIC: 5.5 CM (ref 4.2–5.9)
ECHO LV INTERNAL DIMENSION SYSTOLIC INDEX: 1.76 CM/M2
ECHO LV INTERNAL DIMENSION SYSTOLIC: 3.8 CM
ECHO LV ISOVOLUMETRIC RELAXATION TIME (IVRT): 100 MS
ECHO LV IVSD: 1 CM (ref 0.6–1)
ECHO LV MASS 2D: 185.8 G (ref 88–224)
ECHO LV MASS 3D INDEX: 62.5 G/M2
ECHO LV MASS 3D: 135 G
ECHO LV MASS INDEX 2D: 86 G/M2 (ref 49–115)
ECHO LV POSTERIOR WALL DIASTOLIC: 0.8 CM (ref 0.6–1)
ECHO LV RELATIVE WALL THICKNESS RATIO: 0.29
ECHO LVOT AREA: 3.5 CM2
ECHO LVOT AV VTI INDEX: 0.49
ECHO LVOT DIAM: 2.1 CM
ECHO LVOT MEAN GRADIENT: 2 MMHG
ECHO LVOT PEAK GRADIENT: 4 MMHG
ECHO LVOT PEAK VELOCITY: 0.9 M/S
ECHO LVOT STROKE VOLUME INDEX: 38.3 ML/M2
ECHO LVOT SV: 82.7 ML
ECHO LVOT VTI: 23.9 CM
ECHO MV A VELOCITY: 0.85 M/S
ECHO MV E DECELERATION TIME (DT): 187 MS
ECHO MV E VELOCITY: 0.54 M/S
ECHO MV E/A RATIO: 0.64
ECHO MV E/E' LATERAL: 5.91
ECHO MV E/E' RATIO (AVERAGED): 5.65
ECHO MV E/E' SEPTAL: 5.4
ECHO RA AREA 4C: 16.6 CM2
ECHO RA END SYSTOLIC VOLUME APICAL 4 CHAMBER INDEX BSA: 20 ML/M2
ECHO RA VOLUME: 44 ML
ECHO RIGHT VENTRICULAR SYSTOLIC PRESSURE (RVSP): 25 MMHG
ECHO RV FREE WALL PEAK S': 13.9 CM/S
ECHO RV TAPSE: 2.4 CM (ref 1.7–?)
ECHO TV REGURGITANT MAX VELOCITY: 2.32 M/S
ECHO TV REGURGITANT PEAK GRADIENT: 22 MMHG

## 2024-12-31 PROCEDURE — 93306 TTE W/DOPPLER COMPLETE: CPT | Performed by: INTERNAL MEDICINE

## 2024-12-31 PROCEDURE — 93356 MYOCRD STRAIN IMG SPCKL TRCK: CPT | Performed by: INTERNAL MEDICINE

## 2024-12-31 PROCEDURE — 93306 TTE W/DOPPLER COMPLETE: CPT
